# Patient Record
Sex: FEMALE | HISPANIC OR LATINO | Employment: PART TIME | URBAN - METROPOLITAN AREA
[De-identification: names, ages, dates, MRNs, and addresses within clinical notes are randomized per-mention and may not be internally consistent; named-entity substitution may affect disease eponyms.]

---

## 2018-04-20 ENCOUNTER — APPOINTMENT (OUTPATIENT)
Dept: RADIOLOGY | Facility: MEDICAL CENTER | Age: 58
DRG: 065 | End: 2018-04-20
Attending: EMERGENCY MEDICINE
Payer: MEDICAID

## 2018-04-20 ENCOUNTER — HOSPITAL ENCOUNTER (INPATIENT)
Facility: MEDICAL CENTER | Age: 58
LOS: 5 days | DRG: 065 | End: 2018-04-25
Attending: EMERGENCY MEDICINE | Admitting: HOSPITALIST
Payer: MEDICAID

## 2018-04-20 ENCOUNTER — RESOLUTE PROFESSIONAL BILLING HOSPITAL PROF FEE (OUTPATIENT)
Dept: HOSPITALIST | Facility: MEDICAL CENTER | Age: 58
End: 2018-04-20
Payer: MEDICAID

## 2018-04-20 DIAGNOSIS — I15.9 SECONDARY HYPERTENSION: ICD-10-CM

## 2018-04-20 DIAGNOSIS — I63.9 CEREBROVASCULAR ACCIDENT (CVA), UNSPECIFIED MECHANISM (HCC): ICD-10-CM

## 2018-04-20 DIAGNOSIS — I63.89 CEREBROVASCULAR ACCIDENT (CVA) DUE TO OTHER MECHANISM (HCC): ICD-10-CM

## 2018-04-20 PROBLEM — R79.89 ELEVATED TROPONIN: Status: ACTIVE | Noted: 2018-04-20

## 2018-04-20 PROBLEM — N39.0 UTI (URINARY TRACT INFECTION): Status: ACTIVE | Noted: 2018-04-20

## 2018-04-20 PROBLEM — F15.20 METHAMPHETAMINE DEPENDENCE (HCC): Status: ACTIVE | Noted: 2018-04-20

## 2018-04-20 PROBLEM — E87.6 HYPOKALEMIA: Status: ACTIVE | Noted: 2018-04-20

## 2018-04-20 PROBLEM — I16.0 HYPERTENSIVE URGENCY: Status: ACTIVE | Noted: 2018-04-20

## 2018-04-20 LAB
ALBUMIN SERPL BCP-MCNC: 4.1 G/DL (ref 3.2–4.9)
ALBUMIN/GLOB SERPL: 1.3 G/DL
ALP SERPL-CCNC: 134 U/L (ref 30–99)
ALT SERPL-CCNC: 33 U/L (ref 2–50)
AMPHET UR QL SCN: POSITIVE
ANION GAP SERPL CALC-SCNC: 9 MMOL/L (ref 0–11.9)
APPEARANCE UR: ABNORMAL
APTT PPP: 30.5 SEC (ref 24.7–36)
AST SERPL-CCNC: 33 U/L (ref 12–45)
BACTERIA #/AREA URNS HPF: ABNORMAL /HPF
BARBITURATES UR QL SCN: NEGATIVE
BASOPHILS # BLD AUTO: 0.5 % (ref 0–1.8)
BASOPHILS # BLD: 0.04 K/UL (ref 0–0.12)
BENZODIAZ UR QL SCN: NEGATIVE
BILIRUB SERPL-MCNC: 0.5 MG/DL (ref 0.1–1.5)
BILIRUB UR QL STRIP.AUTO: NEGATIVE
BUN SERPL-MCNC: 18 MG/DL (ref 8–22)
BZE UR QL SCN: NEGATIVE
CALCIUM SERPL-MCNC: 9.7 MG/DL (ref 8.5–10.5)
CANNABINOIDS UR QL SCN: NEGATIVE
CHLORIDE SERPL-SCNC: 107 MMOL/L (ref 96–112)
CO2 SERPL-SCNC: 27 MMOL/L (ref 20–33)
COLOR UR: YELLOW
CREAT SERPL-MCNC: 0.83 MG/DL (ref 0.5–1.4)
EOSINOPHIL # BLD AUTO: 0.21 K/UL (ref 0–0.51)
EOSINOPHIL NFR BLD: 2.6 % (ref 0–6.9)
EPI CELLS #/AREA URNS HPF: ABNORMAL /HPF
ERYTHROCYTE [DISTWIDTH] IN BLOOD BY AUTOMATED COUNT: 49.7 FL (ref 35.9–50)
GLOBULIN SER CALC-MCNC: 3.1 G/DL (ref 1.9–3.5)
GLUCOSE SERPL-MCNC: 96 MG/DL (ref 65–99)
GLUCOSE UR STRIP.AUTO-MCNC: NEGATIVE MG/DL
HCT VFR BLD AUTO: 51.2 % (ref 37–47)
HGB BLD-MCNC: 16.6 G/DL (ref 12–16)
HYALINE CASTS #/AREA URNS LPF: ABNORMAL /LPF
IMM GRANULOCYTES # BLD AUTO: 0.02 K/UL (ref 0–0.11)
IMM GRANULOCYTES NFR BLD AUTO: 0.2 % (ref 0–0.9)
INR PPP: 1 (ref 0.87–1.13)
KETONES UR STRIP.AUTO-MCNC: NEGATIVE MG/DL
LEUKOCYTE ESTERASE UR QL STRIP.AUTO: ABNORMAL
LYMPHOCYTES # BLD AUTO: 1.35 K/UL (ref 1–4.8)
LYMPHOCYTES NFR BLD: 16.8 % (ref 22–41)
MCH RBC QN AUTO: 27.9 PG (ref 27–33)
MCHC RBC AUTO-ENTMCNC: 32.4 G/DL (ref 33.6–35)
MCV RBC AUTO: 86.1 FL (ref 81.4–97.8)
METHADONE UR QL SCN: NEGATIVE
MICRO URNS: ABNORMAL
MONOCYTES # BLD AUTO: 0.43 K/UL (ref 0–0.85)
MONOCYTES NFR BLD AUTO: 5.3 % (ref 0–13.4)
NEUTROPHILS # BLD AUTO: 5.99 K/UL (ref 2–7.15)
NEUTROPHILS NFR BLD: 74.6 % (ref 44–72)
NITRITE UR QL STRIP.AUTO: POSITIVE
NRBC # BLD AUTO: 0 K/UL
NRBC BLD-RTO: 0 /100 WBC
OPIATES UR QL SCN: NEGATIVE
OXYCODONE UR QL SCN: NEGATIVE
PCP UR QL SCN: NEGATIVE
PH UR STRIP.AUTO: 8 [PH]
PLATELET # BLD AUTO: 240 K/UL (ref 164–446)
PMV BLD AUTO: 11.2 FL (ref 9–12.9)
POTASSIUM SERPL-SCNC: 3.4 MMOL/L (ref 3.6–5.5)
PROPOXYPH UR QL SCN: NEGATIVE
PROT SERPL-MCNC: 7.2 G/DL (ref 6–8.2)
PROT UR QL STRIP: NEGATIVE MG/DL
PROTHROMBIN TIME: 12.9 SEC (ref 12–14.6)
RBC # BLD AUTO: 5.95 M/UL (ref 4.2–5.4)
RBC # URNS HPF: ABNORMAL /HPF
RBC UR QL AUTO: NEGATIVE
SODIUM SERPL-SCNC: 143 MMOL/L (ref 135–145)
SP GR UR STRIP.AUTO: 1.02
TROPONIN I SERPL-MCNC: 0.21 NG/ML (ref 0–0.04)
TROPONIN I SERPL-MCNC: 0.23 NG/ML (ref 0–0.04)
TROPONIN I SERPL-MCNC: 0.24 NG/ML (ref 0–0.04)
UROBILINOGEN UR STRIP.AUTO-MCNC: 1 MG/DL
WBC # BLD AUTO: 8 K/UL (ref 4.8–10.8)
WBC #/AREA URNS HPF: ABNORMAL /HPF

## 2018-04-20 PROCEDURE — 84484 ASSAY OF TROPONIN QUANT: CPT

## 2018-04-20 PROCEDURE — 71045 X-RAY EXAM CHEST 1 VIEW: CPT

## 2018-04-20 PROCEDURE — 85610 PROTHROMBIN TIME: CPT

## 2018-04-20 PROCEDURE — 81001 URINALYSIS AUTO W/SCOPE: CPT

## 2018-04-20 PROCEDURE — 700105 HCHG RX REV CODE 258: Performed by: HOSPITALIST

## 2018-04-20 PROCEDURE — 94760 N-INVAS EAR/PLS OXIMETRY 1: CPT

## 2018-04-20 PROCEDURE — 770020 HCHG ROOM/CARE - TELE (206)

## 2018-04-20 PROCEDURE — 99285 EMERGENCY DEPT VISIT HI MDM: CPT

## 2018-04-20 PROCEDURE — 700102 HCHG RX REV CODE 250 W/ 637 OVERRIDE(OP): Performed by: HOSPITALIST

## 2018-04-20 PROCEDURE — 87086 URINE CULTURE/COLONY COUNT: CPT

## 2018-04-20 PROCEDURE — 83036 HEMOGLOBIN GLYCOSYLATED A1C: CPT

## 2018-04-20 PROCEDURE — 85730 THROMBOPLASTIN TIME PARTIAL: CPT

## 2018-04-20 PROCEDURE — 70498 CT ANGIOGRAPHY NECK: CPT

## 2018-04-20 PROCEDURE — 36415 COLL VENOUS BLD VENIPUNCTURE: CPT

## 2018-04-20 PROCEDURE — 87186 SC STD MICRODIL/AGAR DIL: CPT

## 2018-04-20 PROCEDURE — 99223 1ST HOSP IP/OBS HIGH 75: CPT | Performed by: HOSPITALIST

## 2018-04-20 PROCEDURE — 93005 ELECTROCARDIOGRAM TRACING: CPT | Performed by: EMERGENCY MEDICINE

## 2018-04-20 PROCEDURE — 87077 CULTURE AEROBIC IDENTIFY: CPT

## 2018-04-20 PROCEDURE — 80307 DRUG TEST PRSMV CHEM ANLYZR: CPT

## 2018-04-20 PROCEDURE — 700117 HCHG RX CONTRAST REV CODE 255: Performed by: EMERGENCY MEDICINE

## 2018-04-20 PROCEDURE — A9270 NON-COVERED ITEM OR SERVICE: HCPCS | Performed by: HOSPITALIST

## 2018-04-20 PROCEDURE — 80053 COMPREHEN METABOLIC PANEL: CPT

## 2018-04-20 PROCEDURE — 700111 HCHG RX REV CODE 636 W/ 250 OVERRIDE (IP): Performed by: HOSPITALIST

## 2018-04-20 PROCEDURE — 70496 CT ANGIOGRAPHY HEAD: CPT

## 2018-04-20 PROCEDURE — 70450 CT HEAD/BRAIN W/O DYE: CPT

## 2018-04-20 PROCEDURE — 85025 COMPLETE CBC W/AUTO DIFF WBC: CPT

## 2018-04-20 RX ORDER — SODIUM CHLORIDE 9 MG/ML
INJECTION, SOLUTION INTRAVENOUS CONTINUOUS
Status: DISCONTINUED | OUTPATIENT
Start: 2018-04-20 | End: 2018-04-24

## 2018-04-20 RX ORDER — HYDRALAZINE HYDROCHLORIDE 20 MG/ML
10 INJECTION INTRAMUSCULAR; INTRAVENOUS
Status: DISCONTINUED | OUTPATIENT
Start: 2018-04-20 | End: 2018-04-20

## 2018-04-20 RX ORDER — ASPIRIN 81 MG/1
324 TABLET, CHEWABLE ORAL DAILY
Status: DISCONTINUED | OUTPATIENT
Start: 2018-04-20 | End: 2018-04-25 | Stop reason: HOSPADM

## 2018-04-20 RX ORDER — POLYETHYLENE GLYCOL 3350 17 G/17G
1 POWDER, FOR SOLUTION ORAL
Status: DISCONTINUED | OUTPATIENT
Start: 2018-04-20 | End: 2018-04-25 | Stop reason: HOSPADM

## 2018-04-20 RX ORDER — LABETALOL HYDROCHLORIDE 5 MG/ML
10-20 INJECTION, SOLUTION INTRAVENOUS EVERY 4 HOURS PRN
Status: DISCONTINUED | OUTPATIENT
Start: 2018-04-20 | End: 2018-04-25 | Stop reason: HOSPADM

## 2018-04-20 RX ORDER — AMOXICILLIN 250 MG
2 CAPSULE ORAL 2 TIMES DAILY
Status: DISCONTINUED | OUTPATIENT
Start: 2018-04-20 | End: 2018-04-25 | Stop reason: HOSPADM

## 2018-04-20 RX ORDER — ONDANSETRON 4 MG/1
4 TABLET, ORALLY DISINTEGRATING ORAL EVERY 4 HOURS PRN
Status: DISCONTINUED | OUTPATIENT
Start: 2018-04-20 | End: 2018-04-25 | Stop reason: HOSPADM

## 2018-04-20 RX ORDER — PROMETHAZINE HYDROCHLORIDE 25 MG/1
12.5-25 TABLET ORAL EVERY 4 HOURS PRN
Status: DISCONTINUED | OUTPATIENT
Start: 2018-04-20 | End: 2018-04-25 | Stop reason: HOSPADM

## 2018-04-20 RX ORDER — AMLODIPINE BESYLATE 5 MG/1
5 TABLET ORAL DAILY
Status: ON HOLD | COMMUNITY
End: 2018-04-24

## 2018-04-20 RX ORDER — ASPIRIN 325 MG
325 TABLET ORAL DAILY
Status: DISCONTINUED | OUTPATIENT
Start: 2018-04-20 | End: 2018-04-25 | Stop reason: HOSPADM

## 2018-04-20 RX ORDER — POTASSIUM CHLORIDE 20 MEQ/1
20 TABLET, EXTENDED RELEASE ORAL ONCE
Status: COMPLETED | OUTPATIENT
Start: 2018-04-20 | End: 2018-04-20

## 2018-04-20 RX ORDER — LABETALOL HYDROCHLORIDE 5 MG/ML
10 INJECTION, SOLUTION INTRAVENOUS EVERY 4 HOURS PRN
Status: DISCONTINUED | OUTPATIENT
Start: 2018-04-20 | End: 2018-04-20

## 2018-04-20 RX ORDER — ONDANSETRON 2 MG/ML
4 INJECTION INTRAMUSCULAR; INTRAVENOUS EVERY 4 HOURS PRN
Status: DISCONTINUED | OUTPATIENT
Start: 2018-04-20 | End: 2018-04-25 | Stop reason: HOSPADM

## 2018-04-20 RX ORDER — AMLODIPINE BESYLATE 5 MG/1
5 TABLET ORAL DAILY
Status: DISCONTINUED | OUTPATIENT
Start: 2018-04-20 | End: 2018-04-25

## 2018-04-20 RX ORDER — ACETAMINOPHEN 325 MG/1
650 TABLET ORAL EVERY 6 HOURS PRN
Status: DISCONTINUED | OUTPATIENT
Start: 2018-04-20 | End: 2018-04-25 | Stop reason: HOSPADM

## 2018-04-20 RX ORDER — BISACODYL 10 MG
10 SUPPOSITORY, RECTAL RECTAL
Status: DISCONTINUED | OUTPATIENT
Start: 2018-04-20 | End: 2018-04-25 | Stop reason: HOSPADM

## 2018-04-20 RX ORDER — PROMETHAZINE HYDROCHLORIDE 25 MG/1
12.5-25 SUPPOSITORY RECTAL EVERY 4 HOURS PRN
Status: DISCONTINUED | OUTPATIENT
Start: 2018-04-20 | End: 2018-04-25 | Stop reason: HOSPADM

## 2018-04-20 RX ORDER — ATORVASTATIN CALCIUM 80 MG/1
80 TABLET, FILM COATED ORAL EVERY EVENING
Status: DISCONTINUED | OUTPATIENT
Start: 2018-04-20 | End: 2018-04-24

## 2018-04-20 RX ORDER — ASPIRIN 300 MG/1
300 SUPPOSITORY RECTAL DAILY
Status: DISCONTINUED | OUTPATIENT
Start: 2018-04-20 | End: 2018-04-25 | Stop reason: HOSPADM

## 2018-04-20 RX ADMIN — IOHEXOL 80 ML: 350 INJECTION, SOLUTION INTRAVENOUS at 14:45

## 2018-04-20 RX ADMIN — CEFTRIAXONE 2 G: 2 INJECTION, POWDER, FOR SOLUTION INTRAMUSCULAR; INTRAVENOUS at 21:48

## 2018-04-20 RX ADMIN — ATORVASTATIN CALCIUM 80 MG: 80 TABLET, FILM COATED ORAL at 18:02

## 2018-04-20 RX ADMIN — POTASSIUM CHLORIDE 20 MEQ: 1500 TABLET, EXTENDED RELEASE ORAL at 16:29

## 2018-04-20 RX ADMIN — AMLODIPINE BESYLATE 5 MG: 5 TABLET ORAL at 16:27

## 2018-04-20 RX ADMIN — ASPIRIN 325 MG: 325 TABLET ORAL at 16:27

## 2018-04-20 RX ADMIN — STANDARDIZED SENNA CONCENTRATE AND DOCUSATE SODIUM 2 TABLET: 8.6; 5 TABLET, FILM COATED ORAL at 18:02

## 2018-04-20 RX ADMIN — SODIUM CHLORIDE: 9 INJECTION, SOLUTION INTRAVENOUS at 16:23

## 2018-04-20 ASSESSMENT — LIFESTYLE VARIABLES
DO YOU DRINK ALCOHOL: NO
EVER_SMOKED: YES
EVER_SMOKED: YES
DO YOU DRINK ALCOHOL: NO
ALCOHOL_USE: NO

## 2018-04-20 ASSESSMENT — COPD QUESTIONNAIRES
HAVE YOU SMOKED AT LEAST 100 CIGARETTES IN YOUR ENTIRE LIFE: YES
DO YOU EVER COUGH UP ANY MUCUS OR PHLEGM?: NO/ONLY WITH OCCASIONAL COLDS OR INFECTIONS
COPD SCREENING SCORE: 3
DURING THE PAST 4 WEEKS HOW MUCH DID YOU FEEL SHORT OF BREATH: NONE/LITTLE OF THE TIME

## 2018-04-20 ASSESSMENT — PAIN SCALES - GENERAL
PAINLEVEL_OUTOF10: 0
PAINLEVEL_OUTOF10: 0

## 2018-04-20 NOTE — ED NOTES
Medication Reconciliation has been completed by interviewing patient with consent to do so with family/visitors in the room.    Allergies were reviewed    Antibiotics in the past 30 days: NO    Pharmacy:  [facility]

## 2018-04-20 NOTE — ED PROVIDER NOTES
ED Provider Note    Scribed for Renetta Das M.D. by Karson Robles. 4/20/2018, 11:49 AM.    Primary care provider: FAUSTO Soria  Means of arrival: wheel chair  History obtained from: patient  History limited by: none    CHIEF COMPLAINT  Chief Complaint   Patient presents with   • Weakness     R side weakness since 4/19 0200   • Slurred Speech     since last night   • Facial Droop     R side since 4/19       HPI  Lindsay Polanco is a 57 y.o. female who presents to the Emergency Department for evaluation of right-sided weakness onset 34 hours ago. Per patient, she was rearranging her room 2:00 AM yesterday morning when she became tired. When she went to sit down to rest, she noticed her right leg was shaky. The patient then took a shower, and after her shower, she continued to feel increasingly fatigued. After taking a nap, patient woke up with right-sided weakness, prompting her to go to the ED in [place]. She states she had a head CT with some blood work and an EKG, and she was discharged. The patient states she had continued right-sided weakness after her discharge, prompting her to come to the [facility] ED. She endorses associated slurred speech and facial droop. She confirms a history of hypertension. Patient admits she is a current everyday smoker, and she has smoked for many years. She confirms a history of hypertension, but admits she is noncompliant with hypertension medications. The patient denies any neurological history. She denies any vision changes or headache.     REVIEW OF SYSTEMS  Pertinent positives include right-sided weakness, slurred speech, facial droop. Pertinent negatives include no vision changes, headache. All other systems reviewed and negative. C.     PAST MEDICAL HISTORY   has a past medical history of Arthritis; Hypertension; and Stroke (CMS-HCC).    SURGICAL HISTORY  patient denies any surgical history    SOCIAL HISTORY  Social History   Substance Use Topics   • Smoking  "status: Current Every Day Smoker     Packs/day: 1.00     Years: 35.00     Types: Cigarettes   • Smokeless tobacco: Never Used   • Alcohol use No      History   Drug Use No       FAMILY HISTORY  Family History   Problem Relation Age of Onset   • Arthritis Mother    • Diabetes Mother        CURRENT MEDICATIONS  No current facility-administered medications on file prior to encounter.      Current Outpatient Prescriptions on File Prior to Encounter   Medication Sig Dispense Refill   • simvastatin (ZOCOR) 20 MG TABS Take 20 mg by mouth every evening.     • Metoprolol Tartrate (LOPRESSOR PO) Take 37.5 mg by mouth 2 Times a Day.     • hydrALAZINE (APRESOLINE) 25 MG TABS Take 1 Tab by mouth every 8 hours as needed (SBP > 160/100). 90 Tab 0   • amlodipine (NORVASC) 10 MG TABS Take 1 Tab by mouth every day. 30 Tab 2      ALLERGIES  No Known Allergies    PHYSICAL EXAM  VITAL SIGNS: /124 Comment: verified second pressure 204 99  Pulse 91   Temp 36.3 °C (97.3 °F) (Temporal)   Resp 14   Ht 1.549 m (5' 1\")   Wt 77.1 kg (170 lb)   SpO2 99%   BMI 32.12 kg/m²     Constitutional:  Middle-aged female, laying comfortably in the gurney , able to answer questions  HENT: Nose is normal in appearance without rhinorrhea, external ears are normal,  moist mucous membranes  Eyes: Anicteric, pupils are equal round and reactive, there is no conjunctival drainage or pallor. EOMI, no gaze disturbances noted.   Neck: The trachea is midline, there is no obvious mass or meningeal signs. No bruits appreciated.   Cardiovascular: Equal radial pulsation, regular rate and rhythm without murmurs gallops or rubs  Thorax & Lungs: Respiratory rate and effort are normal. There is normal chest excursion with respiration. No wheezes rhonchi or rales noted.  Abdomen: Abdomen is normal in appearance, normal bowel sounds, no pain with cough, nonpulsatile  :  No CVA tenderness to palpation  Musculoskeletal: No deformities noted in all 4 extremities. " Actively moves all 4 extremities  Skin: Visualized skin is warm, no erythema, no rash.  Neurologic:  Right facial weakness, partial paralysis noted. Right pronator drift. NIH score: 3.   Psychiatric: Normal mood and mentation    DIAGNOSTIC STUDIES / PROCEDURES    LABS  Results for orders placed or performed during the hospital encounter of 04/20/18   CBC WITH DIFFERENTIAL   Result Value Ref Range    WBC 8.0 4.8 - 10.8 K/uL    RBC 5.95 (H) 4.20 - 5.40 M/uL    Hemoglobin 16.6 (H) 12.0 - 16.0 g/dL    Hematocrit 51.2 (H) 37.0 - 47.0 %    MCV 86.1 81.4 - 97.8 fL    MCH 27.9 27.0 - 33.0 pg    MCHC 32.4 (L) 33.6 - 35.0 g/dL    RDW 49.7 35.9 - 50.0 fL    Platelet Count 240 164 - 446 K/uL    MPV 11.2 9.0 - 12.9 fL    Neutrophils-Polys 74.60 (H) 44.00 - 72.00 %    Lymphocytes 16.80 (L) 22.00 - 41.00 %    Monocytes 5.30 0.00 - 13.40 %    Eosinophils 2.60 0.00 - 6.90 %    Basophils 0.50 0.00 - 1.80 %    Immature Granulocytes 0.20 0.00 - 0.90 %    Nucleated RBC 0.00 /100 WBC    Neutrophils (Absolute) 5.99 2.00 - 7.15 K/uL    Lymphs (Absolute) 1.35 1.00 - 4.80 K/uL    Monos (Absolute) 0.43 0.00 - 0.85 K/uL    Eos (Absolute) 0.21 0.00 - 0.51 K/uL    Baso (Absolute) 0.04 0.00 - 0.12 K/uL    Immature Granulocytes (abs) 0.02 0.00 - 0.11 K/uL    NRBC (Absolute) 0.00 K/uL   COMP METABOLIC PANEL   Result Value Ref Range    Sodium 143 135 - 145 mmol/L    Potassium 3.4 (L) 3.6 - 5.5 mmol/L    Chloride 107 96 - 112 mmol/L    Co2 27 20 - 33 mmol/L    Anion Gap 9.0 0.0 - 11.9    Glucose 96 65 - 99 mg/dL    Bun 18 8 - 22 mg/dL    Creatinine 0.83 0.50 - 1.40 mg/dL    Calcium 9.7 8.5 - 10.5 mg/dL    AST(SGOT) 33 12 - 45 U/L    ALT(SGPT) 33 2 - 50 U/L    Alkaline Phosphatase 134 (H) 30 - 99 U/L    Total Bilirubin 0.5 0.1 - 1.5 mg/dL    Albumin 4.1 3.2 - 4.9 g/dL    Total Protein 7.2 6.0 - 8.2 g/dL    Globulin 3.1 1.9 - 3.5 g/dL    A-G Ratio 1.3 g/dL   PROTHROMBIN TIME   Result Value Ref Range    PT 12.9 12.0 - 14.6 sec    INR 1.00 0.87 - 1.13    APTT   Result Value Ref Range    APTT 30.5 24.7 - 36.0 sec   TROPONIN   Result Value Ref Range    Troponin I 0.24 (H) 0.00 - 0.04 ng/mL   ESTIMATED GFR   Result Value Ref Range    GFR If African American >60 >60 mL/min/1.73 m 2    GFR If Non African American >60 >60 mL/min/1.73 m 2   EKG (NOW)   Result Value Ref Range    Report       [facility] Emergency Dept.    Test Date:  2018  Pt Name:    LIU THEODORE              Department: ER  MRN:        [id number]                      Room:        09  Gender:     Female                       Technician: [id number]  :        [id number]                   Requested By:EZIO PETERS  Order #:    [id number]                    Reading MD:    Measurements  Intervals                                Axis  Rate:       79                           P:          52  NE:         156                          QRS:        101  QRSD:       100                          T:          100  QT:         432  QTc:        496    Interpretive Statements  SINUS RHYTHM  RIGHT AXIS DEVIATION  PROBABLE INFERIOR INFARCT, OLD  LATERAL LEADS ARE ALSO INVOLVED  BORDERLINE PROLONGED QT INTERVAL  Compared to ECG 10/22/2013 01:22:15  Right-axis deviation now present  Myocardial infarct finding still present        All labs reviewed by me.    EKG  I interpreted this EKG myself.  This is a 12-lead study.  The rhythm is sinus with a rate of 79. There is ST depression with T wave inversion in I and aVL, which is unchanged from prior EKG done in [place].  Interpretation: Probable LVH.     RADIOLOGY  CT-CTA NECK WITH & W/O-POST PROCESSING   Final Result      No focal stenosis, dissection or occlusion of the cervical carotid or vertebral arteries.      CT-CTA HEAD WITH & W/O-POST PROCESS   Final Result      No intracranial aneurysm, focal stenosis or abrupt large vessel cut off.         CT-HEAD W/O   Final Result      1.  Chronic bilateral basal ganglia lacunar infarcts, with mild  compensatory enlargement of frontal horn LEFT lateral ventricle.   2.  No acute intracranial hemorrhage or territorial infarct.   3.  Chronic maxillary sinus disease, worse on the LEFT.      DX-CHEST-PORTABLE (1 VIEW)   Final Result      Cardiomegaly           The radiologist's interpretation of all radiological studies have been reviewed by me.    COURSE & MEDICAL DECISION MAKING  Nursing notes and vital signs were reviewed. (See chart for details)  The patient's  Records from [facility] were reviewed which show CT-head with prior small lacunar infarcts without significant changes, Troponin was slightly elevated at 2.5. Documented normal physical exam with a systolic blood pressure of 221. History was obtained from the patient;     The patient presents with right-sided weakness, and the differential diagnosis includes but is not limited to: hemorrhagic stroke, brain mass. The patient has a history of ischemic stroke. Will rule out hemorrhagic stroke secondary to hypertension.       11:49 AM Initial orders in the Emergency Department included chest x-ray, CBC with differential, CMP, PT, APTT, Troponin, UA, EKG.    11:58 AM - Paged Neurology    12:26 PM - I discussed the patient's case and the above findings with Dr. Sarabia (Neurology) who will consult the patient. She recommends a CT-head is ordered.     The patient is not a candidate for IV alteplase because the onset of her symptoms are outside the window of treatment.     1:53 PM - Paged Hospitalist    1:54 PM - I discussed the patient's case and the above findings with Dr. Shae Swift (Hositalist) who agrees to admit the patient.      ED testing reveals continued mild elevation of troponin, maybe secondary to persistent hypertension. She was seen by Neurology. MRI in 2013 revealed a 1 cm sized ring enhancing lesion. She will be admitted for inpatient work-up and continued imaging.      DISPOSITION:  Patient will be admitted to Dr. Shae Swift in  guarded condition.      FINAL IMPRESSION  1. Cerebrovascular accident (CVA), unspecified mechanism (CMS-HCC)    2. Secondary hypertension          I, Karson Robles (Scribe), am scribing for, and in the presence of, Renetta Das M.D..    Electronically signed by: Karson Robles (Scribe), 4/20/2018    I, Renetta Das M.D. personally performed the services described in this documentation, as scribed by Karson Robles in my presence, and it is both accurate and complete.    The note accurately reflects work and decisions made by me.  Renetta Das  4/21/2018  3:24 PM

## 2018-04-20 NOTE — PROGRESS NOTES
Pt arrived to the unit. VSS, pt is in SR. Assessment complete. A&O x 4. No signs of distress noted at this time. Tele monitor in place, monitor room notified. Pt denies pain. Pt is oriented to the unit, call light, phone system. Fall precaution in place and appropriate signs in place. Call light within reach. Bed is locked and in the lowest position. Pt is educated regarding fall precautions and importance of calling for assistance. Pt denies any additional needs at this time. Will continue to monitor.

## 2018-04-20 NOTE — ED NOTES
"Pt arrives to triage via wheelchair with c/c right side weakness, slurred speech & right facial droop x33 hours.  Pt seen & discharged from a hospital in [place] yesterday- \"I don't know what they said was wrong with me.\"  Daughter brought pt to [facility] for a 2nd opinion.  A&ox4.  Positive facial droop, positive right arm drift, weak push/pulls with R leg, unable to get self out of wheelchair.  Charge RN aware of pt.   "

## 2018-04-20 NOTE — CONSULTS
CC: Right arm and leg weakness    Date of Admission: 4/20/2018    Today's Date: 04/20/18    Consulting Physician: Renetta Das M.D.      HPI:    Lindsay Polanco is a 57 y.o. female with a history of methamphetamine abuse, htn, hld, renal failure, and possible stroke vs. Demyelinating lesion with left side weakness, who developed right arm and leg weakness yesterday at 2am. The patient herself is a poor historian. Her daughter recalls that she had similar symptoms a few years ago. In the EMR, her discharge summary from September 2013 stated that she had a ring enhancing lesion on her brain MRI which was possibly a demyelinating lesion versus stroke or tumor. She had a lumbar puncture that showed zero oligoclonal bands. Cytology report said they could not rule out a lymphoproliferative disorder at that time and suggested sending higher volume tap, but that does not appear to have been done. She was thought to possibly have had a stroke and was started on antihypertensive medications and a statin, but she did not continue taking these medications. During that hospitalization, she also had acute renal failure. She has not followed up with any neurologists since that time.     Pmhx: Prior episode of left side weakness, htn, hld    Social: Current smoker. Former meth abuse. Denies currently.     Fam: Mom with diabetes      ROS:   Constitutional: No fevers or chills.  Eyes: No blurry vision or eye pain.  ENT: No dysphagia or hearing loss.  Respiratory: No cough or shortness of breath.  Cardiovascular: No chest pain or palpitations.  GI: No nausea, vomiting, or diarrhea.  : No urinary incontinence or dysuria.  Musculoskeletal: No joint swelling or arthralgias.  Skin: No skin rashes.  Neuro: No headaches, dizziness, or tremors.  Endocrine: No heat or cold intolerance. No polydipsia or polyuria.  Psych: No depression or anxiety.  Heme/Lymph: No easy bruising or swollen lymph nodes.  All other systems were reviewed  and were negative.     Past Medical History:   Past Medical History:   Diagnosis Date   • Arthritis    • Hypertension    • Stroke (CMS-HCC)        Past Surgical History: , tonsillectomy.     Social History:   Social History     Social History   • Marital status:      Spouse name: N/A   • Number of children: N/A   • Years of education: N/A     Occupational History   • Not on file.     Social History Main Topics   • Smoking status: Current Every Day Smoker     Packs/day: 1.00     Years: 35.00     Types: Cigarettes   • Smokeless tobacco: Never Used   • Alcohol use No   • Drug use: No   • Sexual activity: Not on file     Other Topics Concern   • Not on file     Social History Narrative   • No narrative on file       Allergies: No Known Allergies    No current facility-administered medications for this encounter.     Current Outpatient Prescriptions:   •  amLODIPine (NORVASC) 5 MG Tab, Take 5 mg by mouth every day., Disp: , Rfl:       Physical Exam:   Constitutional: Well-developed, well-nourished,  female, daughter at bedside. Appears stated age.  Cardiovascular: RRR, with no murmurs, rubs or gallops. No carotid bruits. No peripheral edema, pedal pulses intact.   Respiratory: Lungs CTA B/L, no W/R/R. Respiratory effort appears normal.    Skin: Warm, dry, intact. No rashes observed. Multiple tattoos.  Eyes: Sclera anicteric. No eyelid ptosis.  Abdomen: Soft NTTP. No hepatosplenomegaly.   ENMT: Good dentition.  Oropharynx clear.   Extremities: No swelling or edema.  Neck: Supple, no thyromegaly.  Neurologic:   Mental Status: Awake, alert, oriented to person, place, and time.   Speech: Fluent. Able to name and repeat.   Memory: Able to recall recent and remote events accurately.    Concentration: Able to focus on history and follow multi-step commands.              Fund of Knowledge: Appropriate   Cranial Nerves:     CN II: PERRL. No afferent pupillary defect.    CN III, IV, VI: Good eye closure,  EOMI.     CN V: Facial sensation intact and symmetric.     CN VII: No facial asymmetry.     CN VIII: Hearing intact.     CN IX and X: Palate elevates symmetrically. Normal gag reflex.    CN XI: Symmetric shoulder shrug.     CN XII: Tongue midline.    Sensory: Intact light touch.   Coordination: Decreased coordination in right hand with finger tapping.       DTR's: 2+ throughout without clonus.    Babinski: Toes downgoing bilaterally.   Romberg: Negative.   Movements: No tremors observed.   Musculoskeletal:    Strength: Right upper extremity very mild drift, unable to lift right leg fully. 5/5 on the left.   Gait: Deferred   Tone: Normal bulk and tone.   Joints: No swelling in the joints of hands or knees.      1a. LOC: 0  1b. LOC Questions: 0  1c. LOC Commands: 0  2. Best Gaze: 1  3. Visual Fields: 0  4. Facial Paresis: 0  5a. Motor arm left: 0  5b. Motor arm right: 0  6a. Motor leg left: 1  6b. Motor leg right: 1  7. Sensory: 0  8. Best Language: 0  9. Limb Ataxia: 0  10. Dysarthria: 1  11. Extinction/Inattention: 0    Total NIH 4      Labs:  Recent Labs      04/20/18   1141   WBC  8.0   RBC  5.95*   HEMOGLOBIN  16.6*   HEMATOCRIT  51.2*   MCV  86.1   MCH  27.9   RDW  49.7   PLATELETCT  240   MPV  11.2   NEUTSPOLYS  74.60*   LYMPHOCYTES  16.80*   MONOCYTES  5.30   EOSINOPHILS  2.60   BASOPHILS  0.50       Recent Labs      04/20/18   1141   SODIUM  143   POTASSIUM  3.4*   CHLORIDE  107   CO2  27   GLUCOSE  96   BUN  18     Results for LIU THEODORE (MRN [id number]) as of 4/20/2018 14:07   Ref. Range 9/30/2013 09:34   Number Of Tubes Unknown 3   Volume Latest Units: mL 10.0   Color-Body Fluid Unknown Colorless   Character-Body Fluid Unknown Clear   Supernatant Appearance Unknown Colorless   Total WBC Count Latest Ref Range: 0 - 10 cells/uL 3   Total RBC Count Latest Units: cells/uL 11   Lymphs Latest Units: % 80   Mononuclear Cells - CSF Latest Units: % 20   CSF Tube Number Unknown 3   Glucose CSF Latest Ref Range:  40 - 80 mg/dL 51   Total Protein, CSF Latest Ref Range: 15 - 45 mg/dL 89 (H)   IgG CSF Latest Ref Range: 0.0 - 6.0 mg/dL 4.9       Imaging Reviewed:   MRI Brain w/wo contrast from 9/28/2013  1. There is an approximately 1 cm sized ring enhancing lesion in the left caudate nucleus.  Minimal amount of subacute hemorrhage is noted surrounding the lesion.  The differential diagnosis includes subacute hematoma, subacute infarct with hemorrhagic transformation, acute tumefactive demyelinating lesion and less likely neoplasm.  Pre-and postcontrast MR brain is recommended after 4 weeks.  2. Nonspecific T2 hyperintensities adjacent to the bilateral lateral ventricles.  The differencial diagnosis includes microvascular ischemic disease and demyelinating plaques.  3. Few punctate chronic microbleeds.    Assessment/Plan:  56 yo F with pmhx of htn, hld, methamphetamine abuse and prior episode of left sided weakness in 2013 who had a ring enhancing lesion thought to be attributable to an acute stroke, who now presents with right arm and leg weakness and dysarthria since yesterday. She is outside of the time window for alteplase. I would like her to undergo CTA of the Head and Neck to rule out any acute arterial occlusions and be admitted for a brain MRI w/wo contrast.     Recommendation:   MRI of the brain w/wo contrast  CTA Head and NEck  Echo  A1c, Lipid Profile, Sed Rate  PT/OT/Speech    Vanessa Sarabia D.O., M.P.H  MS specialist.   Board Certified Neurologist.  Neurology Clerkship Director, [facility].    Neurology,  [facility].   Tel: [phone]  Fax: [phone]

## 2018-04-21 ENCOUNTER — APPOINTMENT (OUTPATIENT)
Dept: RADIOLOGY | Facility: MEDICAL CENTER | Age: 58
DRG: 065 | End: 2018-04-21
Attending: PSYCHIATRY & NEUROLOGY
Payer: MEDICAID

## 2018-04-21 ENCOUNTER — APPOINTMENT (OUTPATIENT)
Dept: RADIOLOGY | Facility: MEDICAL CENTER | Age: 58
DRG: 065 | End: 2018-04-21
Attending: HOSPITALIST
Payer: MEDICAID

## 2018-04-21 LAB
ANION GAP SERPL CALC-SCNC: 8 MMOL/L (ref 0–11.9)
BASOPHILS # BLD AUTO: 0.4 % (ref 0–1.8)
BASOPHILS # BLD: 0.04 K/UL (ref 0–0.12)
BUN SERPL-MCNC: 16 MG/DL (ref 8–22)
CALCIUM SERPL-MCNC: 9.3 MG/DL (ref 8.5–10.5)
CHLORIDE SERPL-SCNC: 108 MMOL/L (ref 96–112)
CHOLEST SERPL-MCNC: 184 MG/DL (ref 100–199)
CO2 SERPL-SCNC: 23 MMOL/L (ref 20–33)
CREAT SERPL-MCNC: 0.77 MG/DL (ref 0.5–1.4)
EOSINOPHIL # BLD AUTO: 0.37 K/UL (ref 0–0.51)
EOSINOPHIL NFR BLD: 3.3 % (ref 0–6.9)
ERYTHROCYTE [DISTWIDTH] IN BLOOD BY AUTOMATED COUNT: 49.6 FL (ref 35.9–50)
EST. AVERAGE GLUCOSE BLD GHB EST-MCNC: 117 MG/DL
GLUCOSE SERPL-MCNC: 93 MG/DL (ref 65–99)
HBA1C MFR BLD: 5.7 % (ref 0–5.6)
HCT VFR BLD AUTO: 48.6 % (ref 37–47)
HDLC SERPL-MCNC: 47 MG/DL
HGB BLD-MCNC: 16 G/DL (ref 12–16)
IMM GRANULOCYTES # BLD AUTO: 0.05 K/UL (ref 0–0.11)
IMM GRANULOCYTES NFR BLD AUTO: 0.4 % (ref 0–0.9)
LDLC SERPL CALC-MCNC: 104 MG/DL
LV EJECT FRACT  99904: 55
LV EJECT FRACT MOD 2C 99903: 60.73
LV EJECT FRACT MOD 4C 99902: 47.66
LV EJECT FRACT MOD BP 99901: 55.73
LYMPHOCYTES # BLD AUTO: 1.64 K/UL (ref 1–4.8)
LYMPHOCYTES NFR BLD: 14.7 % (ref 22–41)
MCH RBC QN AUTO: 28.2 PG (ref 27–33)
MCHC RBC AUTO-ENTMCNC: 32.9 G/DL (ref 33.6–35)
MCV RBC AUTO: 85.7 FL (ref 81.4–97.8)
MONOCYTES # BLD AUTO: 0.53 K/UL (ref 0–0.85)
MONOCYTES NFR BLD AUTO: 4.7 % (ref 0–13.4)
NEUTROPHILS # BLD AUTO: 8.54 K/UL (ref 2–7.15)
NEUTROPHILS NFR BLD: 76.5 % (ref 44–72)
NRBC # BLD AUTO: 0 K/UL
NRBC BLD-RTO: 0 /100 WBC
PLATELET # BLD AUTO: 252 K/UL (ref 164–446)
PMV BLD AUTO: 11.3 FL (ref 9–12.9)
POTASSIUM SERPL-SCNC: 3.2 MMOL/L (ref 3.6–5.5)
RBC # BLD AUTO: 5.67 M/UL (ref 4.2–5.4)
SODIUM SERPL-SCNC: 139 MMOL/L (ref 135–145)
TRIGL SERPL-MCNC: 166 MG/DL (ref 0–149)
WBC # BLD AUTO: 11.2 K/UL (ref 4.8–10.8)

## 2018-04-21 PROCEDURE — 80061 LIPID PANEL: CPT

## 2018-04-21 PROCEDURE — 36415 COLL VENOUS BLD VENIPUNCTURE: CPT

## 2018-04-21 PROCEDURE — 99232 SBSQ HOSP IP/OBS MODERATE 35: CPT | Performed by: HOSPITALIST

## 2018-04-21 PROCEDURE — 700105 HCHG RX REV CODE 258: Performed by: HOSPITALIST

## 2018-04-21 PROCEDURE — 700111 HCHG RX REV CODE 636 W/ 250 OVERRIDE (IP): Performed by: HOSPITALIST

## 2018-04-21 PROCEDURE — A9270 NON-COVERED ITEM OR SERVICE: HCPCS | Performed by: HOSPITALIST

## 2018-04-21 PROCEDURE — 80048 BASIC METABOLIC PNL TOTAL CA: CPT

## 2018-04-21 PROCEDURE — 770020 HCHG ROOM/CARE - TELE (206)

## 2018-04-21 PROCEDURE — 93306 TTE W/DOPPLER COMPLETE: CPT | Mod: 26 | Performed by: INTERNAL MEDICINE

## 2018-04-21 PROCEDURE — 700101 HCHG RX REV CODE 250: Performed by: HOSPITALIST

## 2018-04-21 PROCEDURE — 93306 TTE W/DOPPLER COMPLETE: CPT

## 2018-04-21 PROCEDURE — 700102 HCHG RX REV CODE 250 W/ 637 OVERRIDE(OP): Performed by: HOSPITALIST

## 2018-04-21 PROCEDURE — 85025 COMPLETE CBC W/AUTO DIFF WBC: CPT

## 2018-04-21 RX ORDER — ENALAPRILAT 1.25 MG/ML
1.25 INJECTION INTRAVENOUS EVERY 6 HOURS PRN
Status: DISCONTINUED | OUTPATIENT
Start: 2018-04-21 | End: 2018-04-25 | Stop reason: HOSPADM

## 2018-04-21 RX ORDER — LORAZEPAM 2 MG/ML
1 INJECTION INTRAMUSCULAR ONCE
Status: COMPLETED | OUTPATIENT
Start: 2018-04-21 | End: 2018-04-22

## 2018-04-21 RX ORDER — HYDRALAZINE HYDROCHLORIDE 20 MG/ML
10 INJECTION INTRAMUSCULAR; INTRAVENOUS EVERY 6 HOURS PRN
Status: DISCONTINUED | OUTPATIENT
Start: 2018-04-21 | End: 2018-04-25 | Stop reason: HOSPADM

## 2018-04-21 RX ADMIN — STANDARDIZED SENNA CONCENTRATE AND DOCUSATE SODIUM 2 TABLET: 8.6; 5 TABLET, FILM COATED ORAL at 05:27

## 2018-04-21 RX ADMIN — ENOXAPARIN SODIUM 40 MG: 100 INJECTION SUBCUTANEOUS at 05:27

## 2018-04-21 RX ADMIN — CEFTRIAXONE 2 G: 2 INJECTION, POWDER, FOR SOLUTION INTRAMUSCULAR; INTRAVENOUS at 21:30

## 2018-04-21 RX ADMIN — ASPIRIN 325 MG: 325 TABLET ORAL at 05:26

## 2018-04-21 RX ADMIN — SODIUM CHLORIDE: 9 INJECTION, SOLUTION INTRAVENOUS at 21:33

## 2018-04-21 RX ADMIN — STANDARDIZED SENNA CONCENTRATE AND DOCUSATE SODIUM 2 TABLET: 8.6; 5 TABLET, FILM COATED ORAL at 18:05

## 2018-04-21 RX ADMIN — ATORVASTATIN CALCIUM 80 MG: 80 TABLET, FILM COATED ORAL at 18:05

## 2018-04-21 RX ADMIN — LABETALOL HYDROCHLORIDE 10 MG: 5 INJECTION, SOLUTION INTRAVENOUS at 21:03

## 2018-04-21 RX ADMIN — AMLODIPINE BESYLATE 5 MG: 5 TABLET ORAL at 05:26

## 2018-04-21 ASSESSMENT — ENCOUNTER SYMPTOMS
DOUBLE VISION: 0
SPEECH CHANGE: 0
PND: 0
HEMOPTYSIS: 0
HEADACHES: 0
NERVOUS/ANXIOUS: 1
CLAUDICATION: 0
SHORTNESS OF BREATH: 0
FEVER: 0
TREMORS: 0
PALPITATIONS: 0
DIZZINESS: 0
CHILLS: 0
HEARTBURN: 0
BLURRED VISION: 0
ORTHOPNEA: 0
SORE THROAT: 0
SPUTUM PRODUCTION: 0
FOCAL WEAKNESS: 1
STRIDOR: 0
COUGH: 0
VOMITING: 0
SENSORY CHANGE: 0
BACK PAIN: 0
CONSTIPATION: 0
PHOTOPHOBIA: 0
WEAKNESS: 1
NECK PAIN: 0
NAUSEA: 0
DEPRESSION: 0
MEMORY LOSS: 0
MYALGIAS: 0
EYE PAIN: 0
TINGLING: 0
BLOOD IN STOOL: 0

## 2018-04-21 ASSESSMENT — PAIN SCALES - GENERAL
PAINLEVEL_OUTOF10: 0

## 2018-04-21 ASSESSMENT — PATIENT HEALTH QUESTIONNAIRE - PHQ9
1. LITTLE INTEREST OR PLEASURE IN DOING THINGS: NOT AT ALL
SUM OF ALL RESPONSES TO PHQ9 QUESTIONS 1 AND 2: 0
2. FEELING DOWN, DEPRESSED, IRRITABLE, OR HOPELESS: NOT AT ALL

## 2018-04-21 ASSESSMENT — LIFESTYLE VARIABLES
SUBSTANCE_ABUSE: 1
DO YOU DRINK ALCOHOL: NO

## 2018-04-21 NOTE — PROGRESS NOTES
Assumed care of PT A&O x 4. Pt resting in bed with no signs of labored breathing. On RA. Tele monitor in place, cardiac rhythm being monitored. Call light within reach, bed in lowest position, upper bed rails up, bed alarm on. Pt was updated on plan of care for the night . Will continue to monitor. Daughter at bedside.

## 2018-04-21 NOTE — ASSESSMENT & PLAN NOTE
She states Last use apparently 1 month ago but UTOX + meth on this admission.  Cessation counseling provided.  She denies any IV use

## 2018-04-21 NOTE — ASSESSMENT & PLAN NOTE
Hx of MRI in 2013 which showed  ring-enhancing lesion never followed up.  She has no swallow dysfunction.  MR-Cervical/thoracic neg for acute processes  MRI brain shows Small sized acute left thalamic infarct and bilateral lacunar infarcts  Echo was normal.  Neurology signed off  Cont aspirin  Discontinue statin due to transaminitis, this was discussed with patient regarding risks outweighing benefits for secondary prevention of CVA  She was recommended skilled nursing however the patient does not want this option, will provide outpatient PTOT prescription to be done in [place]

## 2018-04-21 NOTE — CARE PLAN
Problem: Safety  Goal: Will remain free from falls  Outcome: PROGRESSING AS EXPECTED  Pt mobility assessed at beginning of shift. Pt is a 1-2 assist. Fall precautions in place. Non-slip socks on. Bed in lowest locked position. Bed alarm on. Call light within reach. Pt educated to call for assistance and verbalizes understanding.    Problem: Knowledge Deficit  Goal: Knowledge of disease process/condition, treatment plan, diagnostic tests, and medications will improve  Outcome: PROGRESSING AS EXPECTED  Pt educated about disease process. Reason why medications are taken. And informed about treatment plan.

## 2018-04-21 NOTE — H&P
CHIEF COMPLAINT:  Right upper extremity and right lower extremity weakness.    HISTORY OF PRESENT ILLNESS:  The patient is a 57-year-old female who developed   right upper and right lower extremity weakness yesterday morning.  She   presented to her local emergency room in [place].  She was noted to have   significantly elevated blood pressure and mildly elevated troponin.  She was   treated for hypertension.  They discuss her elevated troponin with Dr. Villegas   who did not feel that she needed inpatient workup as her repeat troponin   remained the same and stressed good blood pressure management and outpatient   followup with him.  The patient is an extremely poor historian.  I am not sure   if she had a focal deficit yesterday as the records from the outlying   emergency room referred that she has generalized weakness.  No family was   present at the bedside during my interview.  The patient is very emotionally   upset and crying during the whole interview.  She is not very cooperative with   history and review of systems.  She denies any chest pain.  She denies any   headache.  She initially denied any drug use; however, when I told her that   her records reported a history of methamphetamine use, she said that her last   use was about a month ago.  She had an MRI in our facility in September 2013   with a ring-enhancing lesion and followup MRI was recommended; however,   apparently the patient has not had any followup on this.  Her weakness is   about the same.  She denies any trauma.  She has not been compliant with her   blood pressure or statin medications.    REVIEW OF SYSTEMS:  As above, otherwise limited by the patient being a poor   historian.    PAST MEDICAL HISTORY:  Significant for:  1.  History of left-sided weakness, suspected to be a stroke with an abnormal   MRI as mentioned above.  2.  Hypertension.  3.  Dyslipidemia.  4.  Arthritis.    PAST SURGICAL HISTORY:  Negative.    SOCIAL HISTORY:  She  smokes 1 pack of cigarettes.  She denies alcohol use.    She is not very forthcoming about her drug use; however, she admits to using   methamphetamine about a month ago.    FAMILY HISTORY:  Positive for diabetes.    HOME MEDICATIONS:  She has not been taking any medications, although she was   prescribed amlodipine yesterday.    PHYSICAL EXAMINATION:  GENERAL:  She is alert and oriented x3.  She is emotionally upset.  VITAL SIGNS:  Temperature is 36.3, pulse is 91, respiratory rate is 14, blood   pressure is 220/110, and pulse oximetry is 99%.  HEAD AND NECK:  Pupils equal.  Supple neck.  No jugular venous distention.    Oropharynx is clear.  No cervical lymphadenopathy.  HEART:  Regular rate and rhythm, normal S1, S2.  No murmurs, rubs, or gallops.  LUNGS:  Clear with symmetric air entry bilaterally.  CHEST:  No chest wall tenderness.  ABDOMEN:  Soft, nontender.  Bowel sounds are positive.  No hepatosplenomegaly.  EXTREMITIES:  No edema, no clubbing, no cyanosis.  NEUROLOGIC:  She has right upper and right lower extremity weakness, 4/5.  No   other focal deficits noted.  SKIN:  No rash.    DIAGNOSTICS:  White blood cell count is 8.0, hemoglobin 16.6, hematocrit 51.2,   and platelet count 240.  Sodium is 143, potassium 3.4, chloride 107,   bicarbonate 27, glucose 96, BUN 18, creatinine 0.83, AST is 33, ALT is also   33, and alkaline phosphatase 134.  Urine drug screen is positive for   amphetamines.  Troponin I is 0.24.  Of note, her troponin at the outlying   facility was 0.25 and 0.31 yesterday at 8:30 a.m. and 12:54 p.m.  Urinalysis   reveals 20-50 white blood cells and positive nitrites.  Chest x-ray reveals   cardiomegaly.  CT of the head without contrast reveals chronic bilateral basal   ganglia lacunar infarcts with mild compensatory enlargement of frontal horn,   left lateral ventricle, no acute intracranial hemorrhage or territory infarct.    Chronic maxillary sinus disease, worse on the left.  CT  angiogram of the   neck reveals no focal stenosis, dissection, or occlusion of the cervical   carotid or vertebral arteries.  CT angiogram of the head revealed no   intracranial aneurysm, focal stenosis, or abrupt large-vessel cutoff.    ASSESSMENT:  1.  Hypertensive urgency.  2.  Elevated troponin, likely related to her hypertensive urgency and   methamphetamine abuse.  3.  Right hemiparesis, possible stroke.  4.  Noncompliance with medical therapy.  5.  Urinary tract infection.    PLAN:  The patient will be admitted and monitored on telemetry.  I will start   her on amlodipine and p.r.n. labetalol.  We will try to keep her systolic   blood pressure to 160 to 180 range given concern for possible stroke.    We will start her on aspirin.    We will check an MRI of the brain.    We will check an echocardiogram.    She has been evaluated by Dr. Sarabia from neurology who recommended MRI with   and without contrast.    We will trend her troponins.    Replete her potassium.    Patient was counseled on methamphetamine cessation and importance of   compliance with medical therapy.    She will be started on atorvastatin.    We will ask for PT and OT evaluations.    We will start her on Lovenox for DVT prophylaxis.    She will likely require more than 2 midnights stay for treatment of her   medical condition.       ____________________________________     MD GABE SEXTON / LI    DD:  04/20/2018 20:07:25  DT:  04/20/2018 20:50:52    D#:  [id number]  Job#:  [id number]

## 2018-04-21 NOTE — PROGRESS NOTES
[facility]    Date of Service: 2018    Chief Complaint  57 y.o. female admitted 2018 for evaluation of right upper and right lower extremity weakness that started one day prior to admission.    Interval Problem Update  Patient emotional and crying, poor historian. Says her left side if weak.  MRI brain pending.  Echocardiogram pending.  Neurology following, awaiting for further recommendations.    Consultants/Specialty  Neurology    Disposition  TBD        Review of Systems   Unable to perform ROS: Dementia   Constitutional: Positive for malaise/fatigue. Negative for chills and fever.   HENT: Negative for congestion, hearing loss, sore throat and tinnitus.    Eyes: Negative for blurred vision, double vision, photophobia and pain.   Respiratory: Negative for cough, hemoptysis, sputum production, shortness of breath and stridor.    Cardiovascular: Negative for chest pain, palpitations, orthopnea, claudication and PND.   Gastrointestinal: Negative for blood in stool, constipation, heartburn, melena, nausea and vomiting.   Genitourinary: Negative for dysuria, frequency and urgency.   Musculoskeletal: Negative for back pain, myalgias and neck pain.   Neurological: Positive for focal weakness and weakness. Negative for dizziness, tingling, tremors, sensory change, speech change and headaches.   Psychiatric/Behavioral: Positive for substance abuse. Negative for depression, memory loss and suicidal ideas. The patient is nervous/anxious.       Physical Exam  Laboratory/Imaging   Hemodynamics  Temp (24hrs), Av.4 °C (97.6 °F), Min:36.3 °C (97.3 °F), Max:36.7 °C (98 °F)   Temperature: 36.4 °C (97.5 °F)  Pulse  Av.9  Min: 72  Max: 91 Heart Rate (Monitored): 82  Blood Pressure: (!) 169/101, NIBP: 159/94      Respiratory      Respiration: 20, Pulse Oximetry: 94 %, O2 Daily Delivery Respiratory : Room Air with O2 Available     Work Of Breathing / Effort: Mild  RUL Breath Sounds: Clear, RML  Breath Sounds: Diminished, RLL Breath Sounds: Diminished, HOSSEIN Breath Sounds: Diminished, LLL Breath Sounds: Diminished    Fluids    Intake/Output Summary (Last 24 hours) at 04/21/18 0800  Last data filed at 04/21/18 0600   Gross per 24 hour   Intake             1540 ml   Output              800 ml   Net              740 ml       Nutrition  Orders Placed This Encounter   Procedures   • Diet Order     Standing Status:   Standing     Number of Occurrences:   1     Order Specific Question:   Diet:     Answer:   Cardiac [6]     Physical Exam   Constitutional: She is oriented to person, place, and time. She appears well-developed and well-nourished. No distress.   HENT:   Head: Normocephalic and atraumatic.   Mouth/Throat: No oropharyngeal exudate.   Eyes: Conjunctivae are normal. Pupils are equal, round, and reactive to light. Right eye exhibits no discharge. No scleral icterus.   Neck: Neck supple. No JVD present. No thyromegaly present.   Cardiovascular: Normal rate and intact distal pulses.    No murmur heard.  Pulmonary/Chest: Effort normal and breath sounds normal. No stridor. No respiratory distress. She has no wheezes. She has no rales.   Abdominal: Soft. Bowel sounds are normal. She exhibits no distension. There is no tenderness. There is no rebound.   Musculoskeletal: She exhibits no edema.   Neurological: She is alert and oriented to person, place, and time. No cranial nerve deficit. She exhibits normal muscle tone.   Strength 5/5 Left UE/LE  Right LE 1/5, Right UE 1/5.  Right UE mild drift,       Skin: Skin is warm. She is not diaphoretic. No erythema.   Psychiatric: Her behavior is normal. Thought content normal.       Recent Labs      04/20/18   1141  04/21/18   0246   WBC  8.0  11.2*   RBC  5.95*  5.67*   HEMOGLOBIN  16.6*  16.0   HEMATOCRIT  51.2*  48.6*   MCV  86.1  85.7   MCH  27.9  28.2   MCHC  32.4*  32.9*   RDW  49.7  49.6   PLATELETCT  240  252   MPV  11.2  11.3     Recent Labs      04/20/18   1141   04/21/18   0246   SODIUM  143  139   POTASSIUM  3.4*  3.2*   CHLORIDE  107  108   CO2  27  23   GLUCOSE  96  93   BUN  18  16   CREATININE  0.83  0.77   CALCIUM  9.7  9.3     Recent Labs      04/20/18   1141   APTT  30.5   INR  1.00         Recent Labs      04/21/18   0246   TRIGLYCERIDE  166*   HDL  47   LDL  104*          Assessment/Plan     * Stroke (cerebrum) (CMS-HCC)- (present on admission)   Assessment & Plan    Hx of MRI in 2013 which showed  ring-enhancing lesion never followed up  CTA Head/neck neg   MRI brain pending  Neurology following.  Cont aspirin, statin for neuroprotective measures.        Methamphetamine dependence (CMS-HCC)- (present on admission)   Assessment & Plan    Last use apparently 1 month ago but UTOX + meth on this admission.  Cessation counseling provided.        Hypokalemia- (present on admission)   Assessment & Plan    Replenish lytes,   CTM labs         UTI (urinary tract infection)- (present on admission)   Assessment & Plan    +UA 20-50 WBCs,   Cultures pending  IV Ceftriaxone continue        Elevated troponin- (present on admission)   Assessment & Plan    Most likely demand from hypertensive urgency  Cont to trend.        Hypertensive urgency- (present on admission)   Assessment & Plan    Will allow for permissive HTN, will use PRN labetalol if sbp >200 systolic.  Patient non compliant with medications, need re-inforcement.      Patient plan of care discussed at multidisplinary team rounds and with patient and R.N at beside.       Quality-Core Measures   Reviewed items::  Labs reviewed, Medications reviewed and Radiology images reviewed  Stallworth catheter::  No Stallworth  DVT prophylaxis pharmacological::  Enoxaparin (Lovenox)  Ulcer Prophylaxis::  Yes

## 2018-04-22 ENCOUNTER — APPOINTMENT (OUTPATIENT)
Dept: RADIOLOGY | Facility: MEDICAL CENTER | Age: 58
DRG: 065 | End: 2018-04-22
Attending: PSYCHIATRY & NEUROLOGY
Payer: MEDICAID

## 2018-04-22 ENCOUNTER — APPOINTMENT (OUTPATIENT)
Dept: RADIOLOGY | Facility: MEDICAL CENTER | Age: 58
DRG: 065 | End: 2018-04-22
Attending: HOSPITALIST
Payer: MEDICAID

## 2018-04-22 LAB
ALBUMIN SERPL BCP-MCNC: 3.6 G/DL (ref 3.2–4.9)
ALBUMIN/GLOB SERPL: 1.3 G/DL
ALP SERPL-CCNC: 133 U/L (ref 30–99)
ALT SERPL-CCNC: 77 U/L (ref 2–50)
ANION GAP SERPL CALC-SCNC: 10 MMOL/L (ref 0–11.9)
AST SERPL-CCNC: 89 U/L (ref 12–45)
BACTERIA UR CULT: ABNORMAL
BACTERIA UR CULT: ABNORMAL
BASOPHILS # BLD AUTO: 0.5 % (ref 0–1.8)
BASOPHILS # BLD: 0.05 K/UL (ref 0–0.12)
BILIRUB SERPL-MCNC: 0.4 MG/DL (ref 0.1–1.5)
BUN SERPL-MCNC: 20 MG/DL (ref 8–22)
CALCIUM SERPL-MCNC: 9.2 MG/DL (ref 8.5–10.5)
CHLORIDE SERPL-SCNC: 108 MMOL/L (ref 96–112)
CO2 SERPL-SCNC: 23 MMOL/L (ref 20–33)
CREAT SERPL-MCNC: 0.82 MG/DL (ref 0.5–1.4)
EOSINOPHIL # BLD AUTO: 0.24 K/UL (ref 0–0.51)
EOSINOPHIL NFR BLD: 2.6 % (ref 0–6.9)
ERYTHROCYTE [DISTWIDTH] IN BLOOD BY AUTOMATED COUNT: 51.1 FL (ref 35.9–50)
GLOBULIN SER CALC-MCNC: 2.7 G/DL (ref 1.9–3.5)
GLUCOSE SERPL-MCNC: 93 MG/DL (ref 65–99)
HCT VFR BLD AUTO: 49.2 % (ref 37–47)
HGB BLD-MCNC: 15.6 G/DL (ref 12–16)
IMM GRANULOCYTES # BLD AUTO: 0.04 K/UL (ref 0–0.11)
IMM GRANULOCYTES NFR BLD AUTO: 0.4 % (ref 0–0.9)
LYMPHOCYTES # BLD AUTO: 1.49 K/UL (ref 1–4.8)
LYMPHOCYTES NFR BLD: 16 % (ref 22–41)
MAGNESIUM SERPL-MCNC: 1.9 MG/DL (ref 1.5–2.5)
MCH RBC QN AUTO: 27.8 PG (ref 27–33)
MCHC RBC AUTO-ENTMCNC: 31.7 G/DL (ref 33.6–35)
MCV RBC AUTO: 87.7 FL (ref 81.4–97.8)
MONOCYTES # BLD AUTO: 0.5 K/UL (ref 0–0.85)
MONOCYTES NFR BLD AUTO: 5.4 % (ref 0–13.4)
NEUTROPHILS # BLD AUTO: 7 K/UL (ref 2–7.15)
NEUTROPHILS NFR BLD: 75.1 % (ref 44–72)
NRBC # BLD AUTO: 0 K/UL
NRBC BLD-RTO: 0 /100 WBC
PLATELET # BLD AUTO: 232 K/UL (ref 164–446)
PMV BLD AUTO: 11.1 FL (ref 9–12.9)
POTASSIUM SERPL-SCNC: 3.4 MMOL/L (ref 3.6–5.5)
PROT SERPL-MCNC: 6.3 G/DL (ref 6–8.2)
RBC # BLD AUTO: 5.61 M/UL (ref 4.2–5.4)
SIGNIFICANT IND 70042: ABNORMAL
SITE SITE: ABNORMAL
SODIUM SERPL-SCNC: 141 MMOL/L (ref 135–145)
SOURCE SOURCE: ABNORMAL
WBC # BLD AUTO: 9.3 K/UL (ref 4.8–10.8)

## 2018-04-22 PROCEDURE — 83735 ASSAY OF MAGNESIUM: CPT

## 2018-04-22 PROCEDURE — 72156 MRI NECK SPINE W/O & W/DYE: CPT

## 2018-04-22 PROCEDURE — 70553 MRI BRAIN STEM W/O & W/DYE: CPT

## 2018-04-22 PROCEDURE — 700117 HCHG RX CONTRAST REV CODE 255: Performed by: HOSPITALIST

## 2018-04-22 PROCEDURE — 700111 HCHG RX REV CODE 636 W/ 250 OVERRIDE (IP): Performed by: HOSPITALIST

## 2018-04-22 PROCEDURE — 80053 COMPREHEN METABOLIC PANEL: CPT

## 2018-04-22 PROCEDURE — 72157 MRI CHEST SPINE W/O & W/DYE: CPT

## 2018-04-22 PROCEDURE — 99232 SBSQ HOSP IP/OBS MODERATE 35: CPT | Performed by: HOSPITALIST

## 2018-04-22 PROCEDURE — 700105 HCHG RX REV CODE 258: Performed by: HOSPITALIST

## 2018-04-22 PROCEDURE — 700102 HCHG RX REV CODE 250 W/ 637 OVERRIDE(OP): Performed by: HOSPITALIST

## 2018-04-22 PROCEDURE — 85025 COMPLETE CBC W/AUTO DIFF WBC: CPT

## 2018-04-22 PROCEDURE — 770020 HCHG ROOM/CARE - TELE (206)

## 2018-04-22 PROCEDURE — A9579 GAD-BASE MR CONTRAST NOS,1ML: HCPCS | Performed by: HOSPITALIST

## 2018-04-22 PROCEDURE — A9270 NON-COVERED ITEM OR SERVICE: HCPCS | Performed by: HOSPITALIST

## 2018-04-22 PROCEDURE — 36415 COLL VENOUS BLD VENIPUNCTURE: CPT

## 2018-04-22 RX ADMIN — ENOXAPARIN SODIUM 40 MG: 100 INJECTION SUBCUTANEOUS at 05:39

## 2018-04-22 RX ADMIN — GADODIAMIDE 15 ML: 287 INJECTION INTRAVENOUS at 15:16

## 2018-04-22 RX ADMIN — AMLODIPINE BESYLATE 5 MG: 5 TABLET ORAL at 05:39

## 2018-04-22 RX ADMIN — CEFTRIAXONE 2 G: 2 INJECTION, POWDER, FOR SOLUTION INTRAMUSCULAR; INTRAVENOUS at 20:03

## 2018-04-22 RX ADMIN — ATORVASTATIN CALCIUM 80 MG: 80 TABLET, FILM COATED ORAL at 17:36

## 2018-04-22 RX ADMIN — STANDARDIZED SENNA CONCENTRATE AND DOCUSATE SODIUM 2 TABLET: 8.6; 5 TABLET, FILM COATED ORAL at 05:39

## 2018-04-22 RX ADMIN — ASPIRIN 325 MG: 325 TABLET ORAL at 05:39

## 2018-04-22 RX ADMIN — STANDARDIZED SENNA CONCENTRATE AND DOCUSATE SODIUM 2 TABLET: 8.6; 5 TABLET, FILM COATED ORAL at 17:37

## 2018-04-22 RX ADMIN — LORAZEPAM 1 MG: 2 INJECTION INTRAMUSCULAR; INTRAVENOUS at 13:52

## 2018-04-22 RX ADMIN — SODIUM CHLORIDE: 9 INJECTION, SOLUTION INTRAVENOUS at 11:47

## 2018-04-22 ASSESSMENT — ENCOUNTER SYMPTOMS
PALPITATIONS: 0
SORE THROAT: 0
BACK PAIN: 0
EYE PAIN: 0
MEMORY LOSS: 0
SHORTNESS OF BREATH: 0
BLOOD IN STOOL: 0
CHILLS: 0
DEPRESSION: 0
CONSTIPATION: 0
BLURRED VISION: 0
SPUTUM PRODUCTION: 0
COUGH: 0
FOCAL WEAKNESS: 1
PHOTOPHOBIA: 0
STRIDOR: 0
HEMOPTYSIS: 0
TINGLING: 0
HEADACHES: 0
PND: 0
NAUSEA: 0
FEVER: 0
WEAKNESS: 1
VOMITING: 0
DOUBLE VISION: 0
SPEECH CHANGE: 0
NECK PAIN: 0
DIZZINESS: 0
MYALGIAS: 0
HEARTBURN: 0
CLAUDICATION: 0
SENSORY CHANGE: 0
ORTHOPNEA: 0
TREMORS: 0
NERVOUS/ANXIOUS: 1

## 2018-04-22 ASSESSMENT — PAIN SCALES - GENERAL
PAINLEVEL_OUTOF10: 0

## 2018-04-22 ASSESSMENT — LIFESTYLE VARIABLES: SUBSTANCE_ABUSE: 1

## 2018-04-22 NOTE — PROGRESS NOTES
Pt continues to rest in bed, no distress noted at this time. Fall precautions in place. Bed in lowest position. Call light within reach. Will continue to monitor.

## 2018-04-22 NOTE — PROGRESS NOTES
[facility] Progress Note    Date of Service: 2018    Chief Complaint  57 y.o. female admitted 2018 for evaluation of right upper and right lower extremity weakness that started one day prior to admission.    Interval Problem Update  Patient emotional and crying, poor historian. Says her left side if weak.  MRI brain pending.  Echocardiogram pending.  Neurology following, awaiting for further recommendations.      No acute events overnight, patient says she feels little better and stronger on her right side, but still not normal.  MR-Cervical/thoracic spine pending.  MRI brain pending  Echo was normal.    Consultants/Specialty  Neurology    Disposition  TBD        Review of Systems   Unable to perform ROS: Dementia   Constitutional: Positive for malaise/fatigue. Negative for chills and fever.   HENT: Negative for congestion, hearing loss, sore throat and tinnitus.    Eyes: Negative for blurred vision, double vision, photophobia and pain.   Respiratory: Negative for cough, hemoptysis, sputum production, shortness of breath and stridor.    Cardiovascular: Negative for chest pain, palpitations, orthopnea, claudication and PND.   Gastrointestinal: Negative for blood in stool, constipation, heartburn, melena, nausea and vomiting.   Genitourinary: Negative for dysuria, frequency and urgency.   Musculoskeletal: Negative for back pain, myalgias and neck pain.   Neurological: Positive for focal weakness and weakness. Negative for dizziness, tingling, tremors, sensory change, speech change and headaches.   Psychiatric/Behavioral: Positive for substance abuse. Negative for depression, memory loss and suicidal ideas. The patient is nervous/anxious.       Physical Exam  Laboratory/Imaging   Hemodynamics  Temp (24hrs), Av.4 °C (97.6 °F), Min:36.2 °C (97.2 °F), Max:36.6 °C (97.9 °F)   Temperature: 36.3 °C (97.4 °F)  Pulse  Av.5  Min: 64  Max: 91    Blood Pressure: (!) 175/97 (RN notified)       Respiratory      Respiration: 20, Pulse Oximetry: 94 %     Work Of Breathing / Effort: Mild  RUL Breath Sounds: Clear, RML Breath Sounds: Diminished, RLL Breath Sounds: Diminished, HOSSEIN Breath Sounds: Clear, LLL Breath Sounds: Diminished    Fluids    Intake/Output Summary (Last 24 hours) at 04/22/18 0924  Last data filed at 04/22/18 0500   Gross per 24 hour   Intake              480 ml   Output              710 ml   Net             -230 ml       Nutrition  Orders Placed This Encounter   Procedures   • Diet Order     Standing Status:   Standing     Number of Occurrences:   1     Order Specific Question:   Diet:     Answer:   Cardiac [6]     Physical Exam   Constitutional: She is oriented to person, place, and time. She appears well-developed and well-nourished. No distress.   HENT:   Head: Normocephalic and atraumatic.   Mouth/Throat: No oropharyngeal exudate.   Eyes: Conjunctivae are normal. Pupils are equal, round, and reactive to light. Right eye exhibits no discharge. No scleral icterus.   Neck: Neck supple. No JVD present. No thyromegaly present.   Cardiovascular: Normal rate and intact distal pulses.    No murmur heard.  Pulmonary/Chest: Effort normal and breath sounds normal. No stridor. No respiratory distress. She has no wheezes. She has no rales.   Abdominal: Soft. Bowel sounds are normal. She exhibits no distension. There is no tenderness. There is no rebound.   Musculoskeletal: She exhibits no edema.   Neurological: She is alert and oriented to person, place, and time. No cranial nerve deficit. She exhibits normal muscle tone.   Strength 5/5 Left UE/LE  Right LE 2/5, Right UE 1/5.  Right UE mild drift,       Skin: Skin is warm. She is not diaphoretic. No erythema.   Psychiatric: Her behavior is normal. Thought content normal.       Recent Labs      04/20/18   1141  04/21/18   0246  04/22/18   0238   WBC  8.0  11.2*  9.3   RBC  5.95*  5.67*  5.61*   HEMOGLOBIN  16.6*  16.0  15.6   HEMATOCRIT  51.2*   48.6*  49.2*   MCV  86.1  85.7  87.7   MCH  27.9  28.2  27.8   MCHC  32.4*  32.9*  31.7*   RDW  49.7  49.6  51.1*   PLATELETCT  240  252  232   MPV  11.2  11.3  11.1     Recent Labs      04/20/18   1141  04/21/18   0246  04/22/18   0238   SODIUM  143  139  141   POTASSIUM  3.4*  3.2*  3.4*   CHLORIDE  107  108  108   CO2  27  23  23   GLUCOSE  96  93  93   BUN  18  16  20   CREATININE  0.83  0.77  0.82   CALCIUM  9.7  9.3  9.2     Recent Labs      04/20/18   1141   APTT  30.5   INR  1.00         Recent Labs      04/21/18   0246   TRIGLYCERIDE  166*   HDL  47   LDL  104*          Assessment/Plan     * Stroke (cerebrum) (CMS-HCC)- (present on admission)   Assessment & Plan    Hx of MRI in 2013 which showed  ring-enhancing lesion never followed up  CTA Head/neck neg   MR-Cervical/thoracic spine pending.  MRI brain pending  Echo was normal.  Neurology following.  Cont aspirin, statin for neuroprotective measures.        Methamphetamine dependence (CMS-HCC)- (present on admission)   Assessment & Plan    Last use apparently 1 month ago but UTOX + meth on this admission.  Cessation counseling provided.        Hypokalemia- (present on admission)   Assessment & Plan    Replenish lytes,   K:3.4 today  CTM labs         UTI (urinary tract infection)- (present on admission)   Assessment & Plan    +UA 20-50 WBCs,   Cultures shows e-coli  IV Ceftriaxone continue x 3 days        Elevated troponin- (present on admission)   Assessment & Plan    Most likely demand from hypertensive urgency  Cont to trend. Denies chest pain        Hypertensive urgency- (present on admission)   Assessment & Plan    Will allow for permissive HTN, will use PRN labetalol if sbp >200 systolic.  Patient non compliant with medications, need re-inforcement.   Improving slowly.     Patient plan of care discussed at multidisplinary team rounds and with patient and R.N at beside.       Quality-Core Measures   Reviewed items::  Labs reviewed, Medications reviewed  and Radiology images reviewed  Stallworth catheter::  No Stallworth  DVT prophylaxis pharmacological::  Enoxaparin (Lovenox)  Ulcer Prophylaxis::  Yes

## 2018-04-22 NOTE — PROGRESS NOTES
Received report from day shift nurseAngie. Discussed plan of care. Family at bedside. Pt is resting in bed, appears to have be fatigued. Assessed fall risk, pt is able to answer and follow instructions appropriately. Pt denies pain or distress at this time. Fall precautions in place. Bed in lowest position. Call light within reach. Will continue to monitor.

## 2018-04-22 NOTE — CARE PLAN
Problem: Communication  Goal: The ability to communicate needs accurately and effectively will improve  Outcome: PROGRESSING AS EXPECTED  Discussed plan of care, reviewed meds with pt, encouraged pt to voice any questions and/or concerns regarding care.       Problem: Safety  Goal: Will remain free from falls  Outcome: PROGRESSING AS EXPECTED  Assessed fall risk, fall precautions initiated. Educated patient on use of call light, no slip socks on, bed lowest position. All needs attended to. Patient verbalized understanding. Pt calls appropriately.

## 2018-04-22 NOTE — PROGRESS NOTES
Lab called with critical result of Urine Culture at Lactose fermenting Gram negative bharti. Critical lab result read back to lab.   Dr. Starks notified of critical lab result at 1840.  Critical lab result read back by Dr. Starks.

## 2018-04-22 NOTE — CARE PLAN
Problem: Communication  Goal: The ability to communicate needs accurately and effectively will improve  Outcome: PROGRESSING AS EXPECTED  Patient communicates effectively.  All needs met. Will continue to monitor.       Problem: Safety  Goal: Will remain free from injury  Outcome: PROGRESSING AS EXPECTED  Bed in lowest, locked position.  Treaded slipper socks on, appropriate signs in place, and call light in reach.

## 2018-04-23 ENCOUNTER — APPOINTMENT (OUTPATIENT)
Dept: RADIOLOGY | Facility: MEDICAL CENTER | Age: 58
DRG: 065 | End: 2018-04-23
Attending: HOSPITALIST
Payer: MEDICAID

## 2018-04-23 PROBLEM — R74.8 ELEVATED LIVER ENZYMES: Status: ACTIVE | Noted: 2018-04-23

## 2018-04-23 LAB
ALBUMIN SERPL BCP-MCNC: 3.4 G/DL (ref 3.2–4.9)
ALBUMIN/GLOB SERPL: 1.1 G/DL
ALP SERPL-CCNC: 145 U/L (ref 30–99)
ALT SERPL-CCNC: 115 U/L (ref 2–50)
ANION GAP SERPL CALC-SCNC: 9 MMOL/L (ref 0–11.9)
AST SERPL-CCNC: 125 U/L (ref 12–45)
BASOPHILS # BLD AUTO: 0.5 % (ref 0–1.8)
BASOPHILS # BLD: 0.04 K/UL (ref 0–0.12)
BILIRUB SERPL-MCNC: 0.3 MG/DL (ref 0.1–1.5)
BUN SERPL-MCNC: 14 MG/DL (ref 8–22)
CALCIUM SERPL-MCNC: 9.3 MG/DL (ref 8.5–10.5)
CHLORIDE SERPL-SCNC: 108 MMOL/L (ref 96–112)
CO2 SERPL-SCNC: 22 MMOL/L (ref 20–33)
CREAT SERPL-MCNC: 0.58 MG/DL (ref 0.5–1.4)
EOSINOPHIL # BLD AUTO: 0.31 K/UL (ref 0–0.51)
EOSINOPHIL NFR BLD: 3.5 % (ref 0–6.9)
ERYTHROCYTE [DISTWIDTH] IN BLOOD BY AUTOMATED COUNT: 49.2 FL (ref 35.9–50)
GLOBULIN SER CALC-MCNC: 3 G/DL (ref 1.9–3.5)
GLUCOSE SERPL-MCNC: 112 MG/DL (ref 65–99)
HAV IGM SERPL QL IA: NEGATIVE
HBV CORE IGM SER QL: NEGATIVE
HBV SURFACE AG SER QL: NEGATIVE
HCT VFR BLD AUTO: 49.1 % (ref 37–47)
HCV AB SER QL: REACTIVE
HGB BLD-MCNC: 15.9 G/DL (ref 12–16)
IMM GRANULOCYTES # BLD AUTO: 0.03 K/UL (ref 0–0.11)
IMM GRANULOCYTES NFR BLD AUTO: 0.3 % (ref 0–0.9)
LYMPHOCYTES # BLD AUTO: 1.49 K/UL (ref 1–4.8)
LYMPHOCYTES NFR BLD: 17 % (ref 22–41)
MAGNESIUM SERPL-MCNC: 1.9 MG/DL (ref 1.5–2.5)
MCH RBC QN AUTO: 27.9 PG (ref 27–33)
MCHC RBC AUTO-ENTMCNC: 32.4 G/DL (ref 33.6–35)
MCV RBC AUTO: 86.1 FL (ref 81.4–97.8)
MONOCYTES # BLD AUTO: 0.49 K/UL (ref 0–0.85)
MONOCYTES NFR BLD AUTO: 5.6 % (ref 0–13.4)
NEUTROPHILS # BLD AUTO: 6.4 K/UL (ref 2–7.15)
NEUTROPHILS NFR BLD: 73.1 % (ref 44–72)
NRBC # BLD AUTO: 0 K/UL
NRBC BLD-RTO: 0 /100 WBC
PLATELET # BLD AUTO: 223 K/UL (ref 164–446)
PMV BLD AUTO: 11.1 FL (ref 9–12.9)
POTASSIUM SERPL-SCNC: 3.4 MMOL/L (ref 3.6–5.5)
PROT SERPL-MCNC: 6.4 G/DL (ref 6–8.2)
RBC # BLD AUTO: 5.7 M/UL (ref 4.2–5.4)
SODIUM SERPL-SCNC: 139 MMOL/L (ref 135–145)
WBC # BLD AUTO: 8.8 K/UL (ref 4.8–10.8)

## 2018-04-23 PROCEDURE — A9270 NON-COVERED ITEM OR SERVICE: HCPCS | Performed by: HOSPITALIST

## 2018-04-23 PROCEDURE — 80074 ACUTE HEPATITIS PANEL: CPT

## 2018-04-23 PROCEDURE — 36415 COLL VENOUS BLD VENIPUNCTURE: CPT

## 2018-04-23 PROCEDURE — 700102 HCHG RX REV CODE 250 W/ 637 OVERRIDE(OP): Performed by: HOSPITALIST

## 2018-04-23 PROCEDURE — 87522 HEPATITIS C REVRS TRNSCRPJ: CPT

## 2018-04-23 PROCEDURE — 700105 HCHG RX REV CODE 258: Performed by: HOSPITALIST

## 2018-04-23 PROCEDURE — 80053 COMPREHEN METABOLIC PANEL: CPT

## 2018-04-23 PROCEDURE — 83735 ASSAY OF MAGNESIUM: CPT

## 2018-04-23 PROCEDURE — G8978 MOBILITY CURRENT STATUS: HCPCS | Mod: CK

## 2018-04-23 PROCEDURE — 99232 SBSQ HOSP IP/OBS MODERATE 35: CPT | Performed by: HOSPITALIST

## 2018-04-23 PROCEDURE — 700111 HCHG RX REV CODE 636 W/ 250 OVERRIDE (IP): Performed by: HOSPITALIST

## 2018-04-23 PROCEDURE — G8988 SELF CARE GOAL STATUS: HCPCS | Mod: CI

## 2018-04-23 PROCEDURE — 97166 OT EVAL MOD COMPLEX 45 MIN: CPT

## 2018-04-23 PROCEDURE — 97162 PT EVAL MOD COMPLEX 30 MIN: CPT

## 2018-04-23 PROCEDURE — G8979 MOBILITY GOAL STATUS: HCPCS | Mod: CI

## 2018-04-23 PROCEDURE — G8987 SELF CARE CURRENT STATUS: HCPCS | Mod: CK

## 2018-04-23 PROCEDURE — 770020 HCHG ROOM/CARE - TELE (206)

## 2018-04-23 PROCEDURE — 85025 COMPLETE CBC W/AUTO DIFF WBC: CPT

## 2018-04-23 RX ORDER — POTASSIUM CHLORIDE 20 MEQ/1
40 TABLET, EXTENDED RELEASE ORAL ONCE
Status: COMPLETED | OUTPATIENT
Start: 2018-04-23 | End: 2018-04-23

## 2018-04-23 RX ADMIN — HYDRALAZINE HYDROCHLORIDE 10 MG: 20 INJECTION INTRAMUSCULAR; INTRAVENOUS at 07:59

## 2018-04-23 RX ADMIN — ENALAPRILAT 1.25 MG: 1.25 INJECTION INTRAVENOUS at 12:54

## 2018-04-23 RX ADMIN — POTASSIUM CHLORIDE 40 MEQ: 1500 TABLET, EXTENDED RELEASE ORAL at 10:44

## 2018-04-23 RX ADMIN — SODIUM CHLORIDE: 9 INJECTION, SOLUTION INTRAVENOUS at 02:18

## 2018-04-23 RX ADMIN — AMLODIPINE BESYLATE 5 MG: 5 TABLET ORAL at 05:24

## 2018-04-23 RX ADMIN — ENOXAPARIN SODIUM 40 MG: 100 INJECTION SUBCUTANEOUS at 05:25

## 2018-04-23 RX ADMIN — STANDARDIZED SENNA CONCENTRATE AND DOCUSATE SODIUM 2 TABLET: 8.6; 5 TABLET, FILM COATED ORAL at 05:24

## 2018-04-23 RX ADMIN — STANDARDIZED SENNA CONCENTRATE AND DOCUSATE SODIUM 2 TABLET: 8.6; 5 TABLET, FILM COATED ORAL at 17:39

## 2018-04-23 RX ADMIN — ATORVASTATIN CALCIUM 80 MG: 80 TABLET, FILM COATED ORAL at 17:39

## 2018-04-23 RX ADMIN — ASPIRIN 325 MG: 325 TABLET ORAL at 05:24

## 2018-04-23 RX ADMIN — SODIUM CHLORIDE: 9 INJECTION, SOLUTION INTRAVENOUS at 15:48

## 2018-04-23 ASSESSMENT — ENCOUNTER SYMPTOMS
MEMORY LOSS: 0
CONSTIPATION: 0
NERVOUS/ANXIOUS: 1
ORTHOPNEA: 0
SPEECH CHANGE: 0
DEPRESSION: 0
FEVER: 0
BLURRED VISION: 0
SHORTNESS OF BREATH: 0
SORE THROAT: 0
CHILLS: 0
SENSORY CHANGE: 0
WEAKNESS: 1
CLAUDICATION: 0
HEADACHES: 0
STRIDOR: 0
HEMOPTYSIS: 0
SPUTUM PRODUCTION: 0
DIZZINESS: 0
PHOTOPHOBIA: 0
MYALGIAS: 0
TINGLING: 0
BLOOD IN STOOL: 0
TREMORS: 0
HEARTBURN: 0
NECK PAIN: 0
PALPITATIONS: 0
COUGH: 0
VOMITING: 0
NAUSEA: 0
BACK PAIN: 0
DOUBLE VISION: 0
PND: 0
FOCAL WEAKNESS: 1
EYE PAIN: 0

## 2018-04-23 ASSESSMENT — PAIN SCALES - GENERAL
PAINLEVEL_OUTOF10: 0

## 2018-04-23 ASSESSMENT — COGNITIVE AND FUNCTIONAL STATUS - GENERAL
SUGGESTED CMS G CODE MODIFIER DAILY ACTIVITY: CK
STANDING UP FROM CHAIR USING ARMS: A LITTLE
WALKING IN HOSPITAL ROOM: A LOT
EATING MEALS: A LITTLE
MOBILITY SCORE: 16
TURNING FROM BACK TO SIDE WHILE IN FLAT BAD: A LITTLE
DRESSING REGULAR UPPER BODY CLOTHING: A LOT
TOILETING: A LOT
HELP NEEDED FOR BATHING: A LOT
CLIMB 3 TO 5 STEPS WITH RAILING: A LOT
PERSONAL GROOMING: A LITTLE
MOVING TO AND FROM BED TO CHAIR: A LITTLE
DAILY ACTIVITIY SCORE: 14
SUGGESTED CMS G CODE MODIFIER MOBILITY: CK
DRESSING REGULAR LOWER BODY CLOTHING: A LOT
MOVING FROM LYING ON BACK TO SITTING ON SIDE OF FLAT BED: A LITTLE

## 2018-04-23 ASSESSMENT — GAIT ASSESSMENTS
GAIT LEVEL OF ASSIST: MODERATE ASSIST
ASSISTIVE DEVICE: FRONT WHEEL WALKER
DISTANCE (FEET): 5

## 2018-04-23 ASSESSMENT — LIFESTYLE VARIABLES: SUBSTANCE_ABUSE: 1

## 2018-04-23 ASSESSMENT — ACTIVITIES OF DAILY LIVING (ADL): TOILETING: INDEPENDENT

## 2018-04-23 NOTE — PROGRESS NOTES
Pt continues to rest in bed, no distress noted at this time. Neuro checks done. Fall precautions in place. Bed in lowest position. Call light within reach. Will continue to monitor.

## 2018-04-23 NOTE — PROGRESS NOTES
Received report from day shift nurse, Monse. Pt is resting in bed, lethargic, family at bedside. Family is anxious regarding updates on plan of care. Education and reassurance provided, family verbalized understanding and apologized for being agitated. Pt denies any pain or distress at this time. NIH scale performed at bedside. Assessed current fluids. Fall precautions in place. Bed in lowest position. Call light within reach. Will continue to monitor.

## 2018-04-23 NOTE — CARE PLAN
Problem: Communication  Goal: The ability to communicate needs accurately and effectively will improve  Outcome: PROGRESSING AS EXPECTED  Discussed plan of care, reviewed meds with pt, encouraged pt to voice any questions and/or concerns regarding care.       Problem: Safety  Goal: Will remain free from falls  Outcome: PROGRESSING AS EXPECTED  Assessed fall risk, fall precautions initiated. Educated patient on use of call light, no slip socks on, bed lowest position. All needs attended to. Patient verbalized understanding.

## 2018-04-23 NOTE — PROGRESS NOTES
Pt continues to rest in bed, no complaints and/or distress noted at this time. Fall precautions in place. Bed in lowest position. Call light within reach. Will continue to monitor.

## 2018-04-23 NOTE — THERAPY
"Occupational Therapy Evaluation completed.   Functional Status:  Pt s/p acute left thalamic infarct, demonstrates moderate to significant decline with ADLs. Pt presents with impaired RUE strength, coordination and tone, impaired balance. Performed bed mobility with mod a, STS from eob with min a, required [place] assist intermittently to maintain  on FWW, LB dressing mod a, UB dressing min/mod a. Pt would benefit from acute skilled services and pt appears to be a good candidate for acute rehab given pt's age and performance during session today. Pt's family present during session, supportive and engaged in education during session.   Plan of Care: Will benefit from Occupational Therapy 4 times per week  Discharge Recommendations:  Equipment: Will Continue to Assess for Equipment Needs.     See \"Rehab Therapy-Acute\" Patient Summary Report for complete documentation.    "

## 2018-04-23 NOTE — PROGRESS NOTES
CC: Stroke    Date of Admission: 4/20/2018    Today's Date: 04/22/18    Consulting Physician: Leonides Starks M.D.      Overnight events:   No acute events overnight. Patient continues to have right arm hemiparesis, which seems worse to her. Her right leg is also weak, but not as much as her arm. Speech is okay.       ROS:   Constitutional: No fevers or chills.  Eyes: No blurry vision or eye pain.  ENT: No dysphagia or hearing loss.  Respiratory: No cough or shortness of breath.  Cardiovascular: No chest pain or palpitations.  GI: No nausea, vomiting, or diarrhea.  : No urinary incontinence or dysuria.  Musculoskeletal: No joint swelling or arthralgias.  Skin: No skin rashes.  Neuro: No headaches, dizziness, or tremors.  Endocrine: No heat or cold intolerance. No polydipsia or polyuria.  Psych: No depression or anxiety.  Heme/Lymph: No easy bruising or swollen lymph nodes.  All other systems were reviewed and were negative.     Past Medical History:   Past Medical History:   Diagnosis Date   • Arthritis    • Hypertension    • Stroke (CMS-HCC)        Past Surgical History: History reviewed. No pertinent surgical history.    Social History:   Social History     Social History   • Marital status:      Spouse name: N/A   • Number of children: N/A   • Years of education: N/A     Occupational History   • Not on file.     Social History Main Topics   • Smoking status: Current Every Day Smoker     Packs/day: 1.00     Years: 35.00     Types: Cigarettes   • Smokeless tobacco: Never Used   • Alcohol use No   • Drug use: No   • Sexual activity: Not on file     Other Topics Concern   • Not on file     Social History Narrative   • No narrative on file       Family History:   Family History   Problem Relation Age of Onset   • Arthritis Mother    • Diabetes Mother        Allergies: No Known Allergies      Current Facility-Administered Medications:   •  enalaprilat (VASOTEC) injection 1.25 mg, 1.25 mg, Intravenous,  Q6HRS PRN, Abbey Bustamante M.D.  •  hydrALAZINE (APRESOLINE) injection 10 mg, 10 mg, Intravenous, Q6HRS PRN, Abbey Bustamante M.D.  •  amLODIPine (NORVASC) tablet 5 mg, 5 mg, Oral, DAILY, Danny Swift M.D., 5 mg at 04/22/18 0539  •  senna-docusate (PERICOLACE or SENOKOT S) 8.6-50 MG per tablet 2 Tab, 2 Tab, Oral, BID, 2 Tab at 04/22/18 1737 **AND** polyethylene glycol/lytes (MIRALAX) PACKET 1 Packet, 1 Packet, Oral, QDAY PRN **AND** magnesium hydroxide (MILK OF MAGNESIA) suspension 30 mL, 30 mL, Oral, QDAY PRN **AND** bisacodyl (DULCOLAX) suppository 10 mg, 10 mg, Rectal, QDAY PRN, Danny Swift M.D.  •  Respiratory Care per Protocol, , Nebulization, Continuous RT, Danny Swift M.D.  •  NS infusion, , Intravenous, Continuous, Danny Swift M.D., Last Rate: 75 mL/hr at 04/22/18 1147  •  enoxaparin (LOVENOX) inj 40 mg, 40 mg, Subcutaneous, DAILY, Danny Swift M.D., 40 mg at 04/22/18 0539  •  acetaminophen (TYLENOL) tablet 650 mg, 650 mg, Oral, Q6HRS PRN, Danny Swift M.D.  •  atorvastatin (LIPITOR) tablet 80 mg, 80 mg, Oral, Q EVENING, Danny Swift M.D., 80 mg at 04/22/18 1736  •  aspirin (ASA) tablet 325 mg, 325 mg, Oral, DAILY, 325 mg at 04/22/18 0539 **OR** aspirin (ASA) chewable tab 324 mg, 324 mg, Oral, DAILY **OR** aspirin (ASA) suppository 300 mg, 300 mg, Rectal, DAILY, Danny Swift M.D.  •  ondansetron (ZOFRAN) syringe/vial injection 4 mg, 4 mg, Intravenous, Q4HRS PRN, Danny Swift M.D.  •  ondansetron (ZOFRAN ODT) dispertab 4 mg, 4 mg, Oral, Q4HRS PRN, Danny Swift M.D.  •  promethazine (PHENERGAN) tablet 12.5-25 mg, 12.5-25 mg, Oral, Q4HRS PRN, Danny Swift M.D.  •  promethazine (PHENERGAN) suppository 12.5-25 mg, 12.5-25 mg, Rectal, Q4HRS PRN, Danny Swift M.D.  •  prochlorperazine (COMPAZINE) injection 5-10 mg, 5-10 mg, Intravenous, Q4HRS PRN, Danny Swift M.D.  •  labetalol (NORMODYNE,TRANDATE) injection  "10-20 mg, 10-20 mg, Intravenous, Q4HRS PRN, Danny Swift M.D., 10 mg at 04/21/18 6899    Imaging:   Brain w/wo contrast   1.  Small sized acute left thalamic infarct.  2.  Moderate microangiopathic ischemic change versus demyelination or gliosis.  3.  Innumerable tiny foci of chronic hemosiderin deposition which could be seen in the setting of amyloid angiopathy, versus chronic hypertensive microhemorrhage.  4.  Chronic bilateral basal ganglia lacunar infarcts.  5.  Findings of sinusitis as described above.    Echocardiogram  CONCLUSIONS  Compared to the images of the study done 9/27/2013 - there has been no   significant change.  Normal left ventricular size and systolic function.  No evidence of right to left shunt by agitated saline challenge.    Physical Exam:   Ambulatory Vitals  Encounter Vitals  Temperature: 36.4 °C (97.5 °F)  Temp Source: Temporal  Blood Pressure: 145/100 (RN notified)  BP Location: Left, Upper Arm  Patient BP Position: Supine  Pulse: 90  Respiration: 18  Pulse Oximetry: 96 %  O2 (LPM): 0  O2 Delivery: None (Room Air)  Weight: 70.9 kg (156 lb 4.9 oz)  How Weight Obtained: Bed Scale  Reason weight was either estimated or stated: Non-weight bearing  Height: 154.9 cm (5' 1\")    Physical Exam:   Constitutional: Well-developed, well-nourished,  female, daughter at bedside. Appears stated age.  Cardiovascular: RRR, with no murmurs, rubs or gallops. No carotid bruits. No peripheral edema, pedal pulses intact.   Respiratory: Lungs CTA B/L, no W/R/R. Respiratory effort appears normal.    Skin: Warm, dry, intact. No rashes observed. Multiple tattoos.  Eyes: Sclera anicteric. No eyelid ptosis.  Abdomen: Soft NTTP. No hepatosplenomegaly.   ENMT: Good dentition.  Oropharynx clear.   Extremities: No swelling or edema.  Neck: Supple, no thyromegaly.  Neurologic:              Mental Status: Awake, alert, oriented to person, place, and time.              Speech: Fluent. Able to name and " repeat.              Memory: Able to recall recent and remote events accurately.               Concentration: Able to focus on history and follow multi-step commands.                         Fund of Knowledge: Appropriate              Cranial Nerves:                           CN II: PERRL. No afferent pupillary defect.                          CN III, IV, VI: Good eye closure, EOMI.                           CN V: Facial sensation intact and symmetric.                           CN VII: No facial asymmetry.                           CN VIII: Hearing intact.                           CN IX and X: Palate elevates symmetrically. Normal gag reflex.                          CN XI: Symmetric shoulder shrug.                           CN XII: Tongue midline.               Sensory: Intact light touch.              Coordination: Decreased coordination in right hand with finger tapping.                  DTR's: 2+ throughout without clonus.               Babinski: Toes downgoing bilaterally.              Romberg: Negative.              Movements: No tremors observed.   Musculoskeletal:               Strength: Right upper extremity very mild drift, unable to lift right leg fully. 5/5 on the left.              Gait: Deferred              Tone: Normal bulk and tone.              Joints: No swelling in the joints of hands or knees.      1a. LOC: 0  1b. LOC Questions: 0  1c. LOC Commands: 0  2. Best Gaze:0  3. Visual Fields: 0  4. Facial Paresis: 0  5a. Motor arm left: 0  5b. Motor arm right: 2  6a. Motor leg left: 0  6b. Motor leg right: 1  7. Sensory: 1  8. Best Language: 0  9. Limb Ataxia: 1  10. Dysarthria: 0  11. Extinction/Inattention: 0    NIH 5         Labs:  Recent Labs      04/20/18   1141  04/21/18   0246  04/22/18   0238   WBC  8.0  11.2*  9.3   RBC  5.95*  5.67*  5.61*   HEMOGLOBIN  16.6*  16.0  15.6   HEMATOCRIT  51.2*  48.6*  49.2*   MCV  86.1  85.7  87.7   MCH  27.9  28.2  27.8   RDW  49.7  49.6  51.1*   PLATELETCT   240  252  232   MPV  11.2  11.3  11.1   NEUTSPOLYS  74.60*  76.50*  75.10*   LYMPHOCYTES  16.80*  14.70*  16.00*   MONOCYTES  5.30  4.70  5.40   EOSINOPHILS  2.60  3.30  2.60   BASOPHILS  0.50  0.40  0.50       Recent Labs      04/20/18   1141  04/21/18   0246  04/22/18   0238   SODIUM  143  139  141   POTASSIUM  3.4*  3.2*  3.4*   CHLORIDE  107  108  108   CO2  27  23  23   GLUCOSE  96  93  93   BUN  18  16  20         Imaging:     Assessment/Plan:  Stroke (cerebrum) (CMS-HCC)  .    UTI (urinary tract infection)  .    Elevated troponin  .    Hypertensive urgency  .    Hypokalemia  .    Methamphetamine dependence (CMS-HCC)  .      58 yo F with methamphetamine abuse, utox positive, and prior stroke, presents with new left thalamic ischemic stroke on MRI after acute onset right face/arm/leg weakness. There was a question as to whether her prior MRI findings, a ring enhancing lesion, was due to stroke vs. An alternative pathology, but her new MRI imaging supports an ischemic stroke process, most likely small vessel disease as a result of her methamphetamine use. There are chronic microbleeds which are likely from chronically elevated hypertension as well. Echocardiogram negative.     Plan:  1. Stop methamphetamines - needs long term counseling  2. Control BP to normotensive range  3. Continue ASA and statin  4. PT/OT for right U and L hemiplegia.   5. Neurology will be signing off as her stroke workup is complete.         Vanessa Sarabia D.O., M.P.H  MS specialist.   Board Certified Neurologist.  Neurology Clerkship Director, [facility].    Neurology,  [facility].   Tel: [phone]  Fax: [phone]

## 2018-04-23 NOTE — PROGRESS NOTES
Patient resting in bed. Family at the bedside. Patient had an episode of epistaxis. No longer bleeding. Bed in low locked position, call bell within reach. Continue to monitor.

## 2018-04-23 NOTE — PROGRESS NOTES
[facility]ist Progress Note    Date of Service: 4/23/2018    Chief Complaint  57 y.o. female admitted 4/20/2018 for evaluation of right upper and right lower extremity weakness that started one day prior to admission.    Interval Problem Update  Patient emotional and crying, poor historian. Says her left side if weak.  MRI brain pending.  Echocardiogram pending.  Neurology following, awaiting for further recommendations.    4/22  No acute events overnight, patient says she feels little better and stronger on her right side, but still not normal.  MR-Cervical/thoracic spine pending.  MRI brain pending  Echo was normal.    4/23  No acute issues overnight. Patient reports mild improvement in her feel and weakness of her right extremities  MRI brain shows Small sized acute left thalamic infarct.  Neurology has signed off  PT/OT pending and most likely placement.    Consultants/Specialty  Neurology    Disposition  TBD        Review of Systems   Unable to perform ROS: Dementia   Constitutional: Positive for malaise/fatigue. Negative for chills and fever.   HENT: Negative for congestion, hearing loss, sore throat and tinnitus.    Eyes: Negative for blurred vision, double vision, photophobia and pain.   Respiratory: Negative for cough, hemoptysis, sputum production, shortness of breath and stridor.    Cardiovascular: Negative for chest pain, palpitations, orthopnea, claudication and PND.   Gastrointestinal: Negative for blood in stool, constipation, heartburn, melena, nausea and vomiting.   Genitourinary: Negative for dysuria, frequency and urgency.   Musculoskeletal: Negative for back pain, myalgias and neck pain.   Neurological: Positive for focal weakness and weakness. Negative for dizziness, tingling, tremors, sensory change, speech change and headaches.   Psychiatric/Behavioral: Positive for substance abuse. Negative for depression, memory loss and suicidal ideas. The patient is nervous/anxious.       Physical  Exam  Laboratory/Imaging   Hemodynamics  Temp (24hrs), Av.2 °C (97.1 °F), Min:35.9 °C (96.6 °F), Max:36.4 °C (97.5 °F)   Temperature: 35.9 °C (96.6 °F)  Pulse  Av.3  Min: 64  Max: 92    Blood Pressure: (!) 170/95 (RN notified)      Respiratory      Respiration: 18, Pulse Oximetry: 94 %     Work Of Breathing / Effort: Mild  RUL Breath Sounds: Clear, RML Breath Sounds: Diminished, RLL Breath Sounds: Diminished, HOSSEIN Breath Sounds: Clear, LLL Breath Sounds: Diminished    Fluids    Intake/Output Summary (Last 24 hours) at 18 0749  Last data filed at 18 1800   Gross per 24 hour   Intake              500 ml   Output             1000 ml   Net             -500 ml       Nutrition  Orders Placed This Encounter   Procedures   • Diet Order     Standing Status:   Standing     Number of Occurrences:   1     Order Specific Question:   Diet:     Answer:   Cardiac [6]     Physical Exam   Constitutional: She is oriented to person, place, and time. She appears well-developed and well-nourished. No distress.   HENT:   Head: Normocephalic and atraumatic.   Mouth/Throat: No oropharyngeal exudate.   Eyes: Conjunctivae are normal. Pupils are equal, round, and reactive to light. Right eye exhibits no discharge. No scleral icterus.   Neck: Neck supple. No JVD present. No thyromegaly present.   Cardiovascular: Normal rate and intact distal pulses.    No murmur heard.  Pulmonary/Chest: Effort normal and breath sounds normal. No stridor. No respiratory distress. She has no wheezes. She has no rales.   Abdominal: Soft. Bowel sounds are normal. She exhibits no distension. There is no tenderness. There is no rebound.   Musculoskeletal: She exhibits no edema.   Neurological: She is alert and oriented to person, place, and time. No cranial nerve deficit. She exhibits normal muscle tone.   Strength 5/5 Left UE/LE  Right LE 2/5, Right UE 1-2/5.  Right UE mild drift,         Skin: Skin is warm. She is not diaphoretic. No erythema.    Psychiatric: Her behavior is normal. Thought content normal.       Recent Labs      04/21/18   0246  04/22/18   0238  04/23/18   0152   WBC  11.2*  9.3  8.8   RBC  5.67*  5.61*  5.70*   HEMOGLOBIN  16.0  15.6  15.9   HEMATOCRIT  48.6*  49.2*  49.1*   MCV  85.7  87.7  86.1   MCH  28.2  27.8  27.9   MCHC  32.9*  31.7*  32.4*   RDW  49.6  51.1*  49.2   PLATELETCT  252  232  223   MPV  11.3  11.1  11.1     Recent Labs      04/21/18   0246  04/22/18   0238  04/23/18   0152   SODIUM  139  141  139   POTASSIUM  3.2*  3.4*  3.4*   CHLORIDE  108  108  108   CO2  23  23  22   GLUCOSE  93  93  112*   BUN  16  20  14   CREATININE  0.77  0.82  0.58   CALCIUM  9.3  9.2  9.3     Recent Labs      04/20/18   1141   APTT  30.5   INR  1.00         Recent Labs      04/21/18   0246   TRIGLYCERIDE  166*   HDL  47   LDL  104*          Assessment/Plan     Stroke (cerebrum) (CMS-[facility])  Hx of MRI in 2013 which showed  ring-enhancing lesion never followed up  CTA Head/neck neg   MR-Cervical/thoracic neg for acute processes  MRI brain shows Small sized acute left thalamic infarct.  Echo was normal.  Neurology signed off  Cont aspirin, statin for neuroprotective measures.    UTI (urinary tract infection)  +UA 20-50 WBCs,   Cultures shows e-coli  IV Ceftriaxone continue x 3 days    Elevated troponin  Most likely demand from hypertensive urgency  Denies chest pain      Hypertensive urgency  Permissive HTN allowed,  Now have restarted norvasc, titrate as needed  PRN hydralazine if SBP >180    Hypokalemia  Replenish lytes,   K:3.4 today  CTM labs     Methamphetamine dependence (CMS-HCC)  Last use apparently 1 month ago but UTOX + meth on this admission.  Cessation counseling provided.    Elevated liver enzymes  Normal on admission,  Possible medication effect?  Hep panel ordered.  Liver ultrasound pending     Patient plan of care discussed at multidisplinary team rounds and with patient and R.N at beside.    Quality-Core Measures   Reviewed  items::  Labs reviewed, Medications reviewed and Radiology images reviewed  Stallworth catheter::  No Stallworth  DVT prophylaxis pharmacological::  Enoxaparin (Lovenox)  Ulcer Prophylaxis::  Yes

## 2018-04-23 NOTE — ASSESSMENT & PLAN NOTE
Painless therefore doubt ischemic injury  Normal on admission, have been rising since statin was started, suspected medication related  Hep panel with positive Hep C Ab.(Patient states she has known about this in the past however says she is cleared the disease)  Check viral load (Hep C)  Check tylenol level  Liver ultrasound negative for e/o obstruction  Will DC statin and repeat LFTs in AM

## 2018-04-23 NOTE — PROGRESS NOTES
Report received from night rn. No issues overnight. Patient hypertensive. PRN given per order. Patient did c/o SOB. Vitals stable. Dr. Starks made aware. NIH scale performed. Neuro checks q 4 hrs. Bed in low locked position, call bell within reach. Family at the bedside. Continue to monitor.

## 2018-04-23 NOTE — PROGRESS NOTES
Spoke to SW about patient's refusal to be transferred to an inpatient Rehab facility. Patient would like to be outpatient options or home services. Discussed with family that transitional care will come speak with them. Will continue to monitor.

## 2018-04-24 ENCOUNTER — APPOINTMENT (OUTPATIENT)
Dept: RADIOLOGY | Facility: MEDICAL CENTER | Age: 58
DRG: 065 | End: 2018-04-24
Attending: HOSPITALIST
Payer: MEDICAID

## 2018-04-24 ENCOUNTER — HOSPITAL ENCOUNTER (INPATIENT)
Facility: REHABILITATION | Age: 58
End: 2018-04-24
Attending: PHYSICAL MEDICINE & REHABILITATION | Admitting: PHYSICAL MEDICINE & REHABILITATION
Payer: MEDICAID

## 2018-04-24 LAB
ALBUMIN SERPL BCP-MCNC: 3.7 G/DL (ref 3.2–4.9)
ALBUMIN/GLOB SERPL: 1.2 G/DL
ALP SERPL-CCNC: 185 U/L (ref 30–99)
ALT SERPL-CCNC: 168 U/L (ref 2–50)
ANION GAP SERPL CALC-SCNC: 9 MMOL/L (ref 0–11.9)
APAP SERPL-MCNC: <10 UG/ML (ref 10–30)
AST SERPL-CCNC: 133 U/L (ref 12–45)
BASOPHILS # BLD AUTO: 0.3 % (ref 0–1.8)
BASOPHILS # BLD: 0.03 K/UL (ref 0–0.12)
BILIRUB SERPL-MCNC: 0.5 MG/DL (ref 0.1–1.5)
BUN SERPL-MCNC: 14 MG/DL (ref 8–22)
CALCIUM SERPL-MCNC: 9.3 MG/DL (ref 8.5–10.5)
CHLORIDE SERPL-SCNC: 108 MMOL/L (ref 96–112)
CO2 SERPL-SCNC: 21 MMOL/L (ref 20–33)
CREAT SERPL-MCNC: 0.68 MG/DL (ref 0.5–1.4)
EOSINOPHIL # BLD AUTO: 0.33 K/UL (ref 0–0.51)
EOSINOPHIL NFR BLD: 3.8 % (ref 0–6.9)
ERYTHROCYTE [DISTWIDTH] IN BLOOD BY AUTOMATED COUNT: 48.5 FL (ref 35.9–50)
GLOBULIN SER CALC-MCNC: 3 G/DL (ref 1.9–3.5)
GLUCOSE SERPL-MCNC: 99 MG/DL (ref 65–99)
HCT VFR BLD AUTO: 50.9 % (ref 37–47)
HGB BLD-MCNC: 17.1 G/DL (ref 12–16)
IMM GRANULOCYTES # BLD AUTO: 0.05 K/UL (ref 0–0.11)
IMM GRANULOCYTES NFR BLD AUTO: 0.6 % (ref 0–0.9)
LYMPHOCYTES # BLD AUTO: 1.4 K/UL (ref 1–4.8)
LYMPHOCYTES NFR BLD: 15.9 % (ref 22–41)
MAGNESIUM SERPL-MCNC: 1.9 MG/DL (ref 1.5–2.5)
MCH RBC QN AUTO: 28.4 PG (ref 27–33)
MCHC RBC AUTO-ENTMCNC: 33.6 G/DL (ref 33.6–35)
MCV RBC AUTO: 84.4 FL (ref 81.4–97.8)
MONOCYTES # BLD AUTO: 0.48 K/UL (ref 0–0.85)
MONOCYTES NFR BLD AUTO: 5.5 % (ref 0–13.4)
NEUTROPHILS # BLD AUTO: 6.49 K/UL (ref 2–7.15)
NEUTROPHILS NFR BLD: 73.9 % (ref 44–72)
NRBC # BLD AUTO: 0 K/UL
NRBC BLD-RTO: 0 /100 WBC
PLATELET # BLD AUTO: 210 K/UL (ref 164–446)
PMV BLD AUTO: 11.6 FL (ref 9–12.9)
POTASSIUM SERPL-SCNC: 4 MMOL/L (ref 3.6–5.5)
PROT SERPL-MCNC: 6.7 G/DL (ref 6–8.2)
RBC # BLD AUTO: 6.03 M/UL (ref 4.2–5.4)
SODIUM SERPL-SCNC: 138 MMOL/L (ref 135–145)
WBC # BLD AUTO: 8.8 K/UL (ref 4.8–10.8)

## 2018-04-24 PROCEDURE — 83735 ASSAY OF MAGNESIUM: CPT

## 2018-04-24 PROCEDURE — 700102 HCHG RX REV CODE 250 W/ 637 OVERRIDE(OP): Performed by: HOSPITALIST

## 2018-04-24 PROCEDURE — A9270 NON-COVERED ITEM OR SERVICE: HCPCS | Performed by: HOSPITALIST

## 2018-04-24 PROCEDURE — 36415 COLL VENOUS BLD VENIPUNCTURE: CPT

## 2018-04-24 PROCEDURE — 770020 HCHG ROOM/CARE - TELE (206)

## 2018-04-24 PROCEDURE — 80307 DRUG TEST PRSMV CHEM ANLYZR: CPT

## 2018-04-24 PROCEDURE — 99232 SBSQ HOSP IP/OBS MODERATE 35: CPT | Mod: 25 | Performed by: INTERNAL MEDICINE

## 2018-04-24 PROCEDURE — 700111 HCHG RX REV CODE 636 W/ 250 OVERRIDE (IP): Performed by: HOSPITALIST

## 2018-04-24 PROCEDURE — 700111 HCHG RX REV CODE 636 W/ 250 OVERRIDE (IP): Performed by: INTERNAL MEDICINE

## 2018-04-24 PROCEDURE — 99407 BEHAV CHNG SMOKING > 10 MIN: CPT | Performed by: INTERNAL MEDICINE

## 2018-04-24 PROCEDURE — 80053 COMPREHEN METABOLIC PANEL: CPT

## 2018-04-24 PROCEDURE — 700105 HCHG RX REV CODE 258: Performed by: HOSPITALIST

## 2018-04-24 PROCEDURE — 85025 COMPLETE CBC W/AUTO DIFF WBC: CPT

## 2018-04-24 PROCEDURE — 76705 ECHO EXAM OF ABDOMEN: CPT

## 2018-04-24 PROCEDURE — 87522 HEPATITIS C REVRS TRNSCRPJ: CPT

## 2018-04-24 RX ORDER — ASPIRIN 325 MG
325 TABLET ORAL DAILY
Qty: 100 TAB
Start: 2018-04-25

## 2018-04-24 RX ORDER — AMLODIPINE BESYLATE 10 MG/1
10 TABLET ORAL DAILY
Qty: 30 TAB | Refills: 2
Start: 2018-04-24

## 2018-04-24 RX ADMIN — ASPIRIN 325 MG: 325 TABLET ORAL at 05:14

## 2018-04-24 RX ADMIN — SODIUM CHLORIDE: 9 INJECTION, SOLUTION INTRAVENOUS at 04:01

## 2018-04-24 RX ADMIN — ENOXAPARIN SODIUM 40 MG: 100 INJECTION SUBCUTANEOUS at 05:14

## 2018-04-24 RX ADMIN — CEFTRIAXONE 2 G: 2 INJECTION, POWDER, FOR SOLUTION INTRAMUSCULAR; INTRAVENOUS at 10:32

## 2018-04-24 RX ADMIN — STANDARDIZED SENNA CONCENTRATE AND DOCUSATE SODIUM 2 TABLET: 8.6; 5 TABLET, FILM COATED ORAL at 05:14

## 2018-04-24 RX ADMIN — AMLODIPINE BESYLATE 5 MG: 5 TABLET ORAL at 05:14

## 2018-04-24 ASSESSMENT — ENCOUNTER SYMPTOMS
DEPRESSION: 1
HEADACHES: 0
PHOTOPHOBIA: 0
CONSTIPATION: 0
CLAUDICATION: 0
SORE THROAT: 0
BLOOD IN STOOL: 0
SHORTNESS OF BREATH: 0
EYE PAIN: 0
TINGLING: 0
FEVER: 0
BACK PAIN: 0
TREMORS: 0
BLURRED VISION: 0
DOUBLE VISION: 0
NAUSEA: 0
VOMITING: 0
SPUTUM PRODUCTION: 0
COUGH: 0
MEMORY LOSS: 0
MYALGIAS: 0
HEARTBURN: 0
PND: 0
WEAKNESS: 1
HEMOPTYSIS: 0
NECK PAIN: 0
FOCAL WEAKNESS: 1
SPEECH CHANGE: 0
STRIDOR: 0
DIZZINESS: 0
NERVOUS/ANXIOUS: 1
PALPITATIONS: 0
ORTHOPNEA: 0
CHILLS: 0
SENSORY CHANGE: 0

## 2018-04-24 ASSESSMENT — PAIN SCALES - GENERAL
PAINLEVEL_OUTOF10: 0

## 2018-04-24 ASSESSMENT — LIFESTYLE VARIABLES: SUBSTANCE_ABUSE: 1

## 2018-04-24 NOTE — PREADMISSION SCREENING NOTE
Pre-Admission Screening Form    Patient Information:   Name: Lindsay Polanco     MRN: [id number]       : 1960      Age: 57 y.o.   Gender: female      Race:  or  [3]       Marital Status:  [4]  Family Contact: Carmen Marin Leonard Ramirez, Jerri        Relationship: Daughter [2]  Brother [1]  Daughter [2]  Home Phone: [phone]           Cell Phone:       Advanced Directives: None  Code Status:  FULL  Current Attending Provider: Catrachita Sun D.O.  Referring Physician: Dr. Starks      Physiatrist Consult: Dr. Pulido        Referral Date: 2018  Primary Payor Source:  MEDICAID [id number]  Secondary Payor Source:      Medical Information:   Date of Admission to Acute Care [place]  Room Number: T705/00  Rehabilitation Diagnosis: 01.2 (R) Body Involvement (L) Brain   Small sized acute left thalamic infarct  Immunization History   Administered Date(s) Administered   • Hep A/HEP B Combined Vaccine ([employer]) 2006, 06/15/2006, 2007   • Influenza, Unspecified 2006, 2007   • Pneumococcal polysaccharide vaccine (PPSV-23) 2006   • TD Vaccine 2006     No Known Allergies  Past Medical History:   Diagnosis Date   • Arthritis    • Hypertension    • Stroke (CMS-[facility])      History reviewed. No pertinent surgical history.    History Leading to Admission, Conditions that Caused the Need for Rehab (CMS):        Danny Swift M.D. Physician Signed [facility]  H&P Date of Service: 2018  8:07 PM           CHIEF COMPLAINT:  Right upper extremity and right lower extremity weakness.     HISTORY OF PRESENT ILLNESS:  The patient is a 57-year-old female who developed   right upper and right lower extremity weakness yesterday morning.  She   presented to her local emergency room in [place].  She was noted to have   significantly elevated blood pressure and mildly elevated troponin.  She was   treated for  hypertension.  They discuss her elevated troponin with Dr. Villegas   who did not feel that she needed inpatient workup as her repeat troponin   remained the same and stressed good blood pressure management and outpatient   followup with him.  The patient is an extremely poor historian.  I am not sure   if she had a focal deficit yesterday as the records from the outlying   emergency room referred that she has generalized weakness.  No family was   present at the bedside during my interview.  The patient is very emotionally   upset and crying during the whole interview.  She is not very cooperative with   history and review of systems.  She denies any chest pain.  She denies any   headache.  She initially denied any drug use; however, when I told her that   her records reported a history of methamphetamine use, she said that her last   use was about a month ago.  She had an MRI in our facility in September 2013   with a ring-enhancing lesion and followup MRI was recommended; however,   apparently the patient has not had any followup on this.  Her weakness is   about the same.  She denies any trauma.  She has not been compliant with her   blood pressure or statin medications.        ASSESSMENT:  1.  Hypertensive urgency.  2.  Elevated troponin, likely related to her hypertensive urgency and   methamphetamine abuse.  3.  Right hemiparesis, possible stroke.  4.  Noncompliance with medical therapy.  5.  Urinary tract infection.     PLAN:  The patient will be admitted and monitored on telemetry.  I will start   her on amlodipine and p.r.n. labetalol.  We will try to keep her systolic   blood pressure to 160 to 180 range given concern for possible stroke.     We will start her on aspirin.     We will check an MRI of the brain.     We will check an echocardiogram.     She has been evaluated by Dr. Sarabia from neurology who recommended MRI with   and without contrast.     We will trend her troponins.     Replete her  potassium.     Patient was counseled on methamphetamine cessation and importance of   compliance with medical therapy.     She will be started on atorvastatin.     We will ask for PT and OT evaluations.     We will start her on Lovenox for DVT prophylaxis.     She will likely require more than 2 midnights stay for treatment of her   medical condition.        ____________________________________     SHANNA RUBÉN SHARPE MD     GB / [facility]     DD:  04/20/2018 20:07:25    Vanessa Sarabia D.O. Physician Signed Neurology  Consults Date of Service: 4/20/2018  1:44 PM      Expand All Collapse All      CC: Right arm and leg weakness        Assessment/Plan:  56 yo F with pmhx of htn, hld, methamphetamine abuse and prior episode of left sided weakness in 2013 who had a ring enhancing lesion thought to be attributable to an acute stroke, who now presents with right arm and leg weakness and dysarthria since yesterday. She is outside of the time window for alteplase. I would like her to undergo CTA of the Head and Neck to rule out any acute arterial occlusions and be admitted for a brain MRI w/wo contrast.      Recommendation:   MRI of the brain w/wo contrast  CTA Head and NEck  Echo  A1c, Lipid Profile, Sed Rate  PT/OT/Speech     Vanessa Sarabia D.O., M.P.H  MS specialist.   Board Certified Neurologist.  Neurology Clerkship Director, [facility].    Neurology,  [facility].   Tel: [phone]  Fax: [phone]         Crystal Pulido M.D. Physician Signed Physical Medicine & Rehab  Consults Date of Service: 4/24/2018  9:55 AM   Consult Orders:   IP CONSULT FOR PHYSIATRY [[id number]] ordered by Leonides Starks M.D. at 04/24/18 0916      Expand All Collapse All      Medical chart review completed.      Patient is a 57 y.o. year-old female with a past medical history significant for arthritis, HLD, renal failure, possible previous CVA,  and HTN admitted to [facility] on 4/20/2018 11:04 AM with right UE and LE weakness on 4/19/18.  She initially presented to ED in [place] and noted to have elevated BP and mildly elevated troponins.  Per report patient has history of methamphetamine abuse as well as possible stroke in 2013. CT showed chronic bilateral basal ganglia lacunar infarcts.  CTA without evidence of acute abnormality.  She was transferred to [facility] on 4/20/18.  Neurology was consulted and recommended MRI w/wo contrast as well as echo.  MRI brain revealed left thalamic infarct.  Echo was unremarkable.  Currently on Ceftriaxone for 3 doses for E. Coli UTI.  Statin has been held for elevated liver enzymes.      Patient was previously independent living alone in 1 story home; per report children can stay with her as needed.  Patient was evaluated by PT on 4/23/18 and found to be modA for bed mobility and modA for gait.  Patient was evaluated by OT on 4/23/18 and found to be min-modA for ADLs.                 PSYCHOSOCIAL HISTORY:  Living Site:  Home  Living With:  self  Caregiver's availability:  Family  Number of stairs:  0  Substance use history:  Methamphetamine         The patient presents  with cognitive deficits and functional deficits in mobility/self-cares/swallowing/speech, and Moderate  de-conditioning. Pre-morbidly, this patient lived in a single level home with None steps to enter,alone and able to care for self  The patient was evaluated by acute care Physical Therapy, Occupational Therapy and Speech Language Pathology; currently requiring moderate assistance for mobility and moderate assistance for ADLs, also with ongoing cognitive and swallowing deficits. The patient's current diet is NPO with no liquids.      The patient is   a Good candidate for an acute inpatient rehabilitation program with a coordinated program of care at an intensity and frequency not available at a lower level of care.      Note: This recommendation  requires that patient has at least CGA/Minimal Assistance needs in at least two therapy disciplines.  If patient progresses to no longer need CGA/[place] with at least two therapy disciplines they may be more appropriate for Skilled nursing facility versus home with home health.       This recommendation is substantiated by the patient's current medical condition with intervention and assessment of medical issues requiring an acute level of care for patient's safety and maximum outcome. A coordinated program of care will be provided by an interdisciplinary team including physical therapy, occupational therapy, speech language pathology, hospitalist, physiatry, rehab nursing and rehab psychology. Rehab goals include improved cognition and swallowing, mobility, self-care management, strength and conditioning/endurance, pain management, bowel and bladder management, mood and affect, and safety with independent home management including caregiver training. Estimated length of stay is approximately 14-21 days. Rehab potential: Fair. Disposition: to pre-morbid independent living setting with supportive care of patient's family. We will continue to follow with you in anticipation of discharge to acute inpatient rehabilitation when medically stable to do so at the discretion of the attending physician. Thank you for allowing us to participate in this patient's care. Please call with any questions regarding this recommendation.     Crystal Pulido M.D.       Co-morbidities: as listed above and below.  Potential Risk - Complications: Aphasia, Cognitive Impairment, Contractures, Deep Vein Thrombosis, Dysphagia, Incontinence, Malnutrition, Pain, Paralysis, Perceptual Impairment, Pneumonia, Pressure Ulcer and Urinary Tract Infection  Level of Risk: High    Ongoing Medical Management Needed (Medical/Nursing Needs):   Patient Active Problem List    Diagnosis Date Noted   • Stroke (cerebrum) (CMS-[facility]) 04/20/2018     Priority:  "High   • Elevated liver enzymes 04/23/2018   • Hypertensive urgency 04/20/2018   • Elevated troponin 04/20/2018   • UTI (urinary tract infection) 04/20/2018   • Hypokalemia 04/20/2018   • Methamphetamine dependence (CMS-HCC) 04/20/2018   • ARF (acute renal failure) (CMS-[facility]) 10/22/2013   • HTN (hypertension) 09/27/2013   • Tobacco abuse 09/27/2013   • Dyslipidemia 09/27/2013   • History of drug abuse 09/27/2013       Current Vital Signs:   Temperature: 36.4 °C (97.6 °F) Pulse: 78 Respiration: 19 Blood Pressure: (!) 163/110 (RN notified)  Weight: 71.1 kg (156 lb 12 oz) Height: 154.9 cm (5' 1\")  Pulse Oximetry: 94 % O2 (LPM): 0      Completed Laboratory Reports:  Recent Labs      04/22/18   0238  04/23/18   0152  04/24/18   0210   WBC  9.3  8.8  8.8   HEMOGLOBIN  15.6  15.9  17.1*   HEMATOCRIT  49.2*  49.1*  50.9*   PLATELETCT  232  223  210   SODIUM  141  139  138   POTASSIUM  3.4*  3.4*  4.0   BUN  20  14  14   CREATININE  0.82  0.58  0.68   ALBUMIN  3.6  3.4  3.7   GLUCOSE  93  112*  99     HISTORY/REASON FOR EXAM:  Right-sided hemiparesis..      TECHNIQUE/EXAM DESCRIPTION:  MRI of the brain without and with contrast, with diffusion.    The study was performed on a Revinate Signa 1.5 Hazel MRI scanner. Spoiled-GRASS sagittal, thin-section T2 axial, T1 coronal, T1 postcontrast coronal, T1 postcontrast axial, FLAIR coronal and diffusion-weighted axial images were obtained of the whole brain.    15 mL Omniscan contrast were administered intravenously.    COMPARISON:  9/27/2013.    FINDINGS:  The cortical sulci and gyri are within normal limits. The ventricular system is within normal limits. The bony calvaria are intact. There is no evidence of extra-axial fluid collection or intracranial mass effect.    There is a small sized acute left thalamic infarct. There is a pattern of moderate supratentorial white matter disease with patchy and focal areas of bright T2 and FLAIR signal in the subcortical and deep white matter of " both hemispheres consistent with   small vessel ischemic change versus demyelination or gliosis. There are innumerable tiny foci of gradient echo signal hypointensity throughout the brain.    There are chronic bilateral basal ganglia lacunar infarcts. There is no evidence of abnormal diffusion-weighted signal intensity in the brain. There is no evidence of abnormal enhancement in the brain parenchyma.    There are normal flow voids in the cavernous carotid arteries and M1 segments. There are normal flow voids in the distal vertebral basilar system. There are normal flow voids in the dural venous sinuses. There is left maxillary sinus mucosal thickening.   There is sphenoid sinus mucosal thickening. The mastoid air cells and remaining paranasal sinuses are well aerated.   Impression       1.  Small sized acute left thalamic infarct.  2.  Moderate microangiopathic ischemic change versus demyelination or gliosis.  3.  Innumerable tiny foci of chronic hemosiderin deposition which could be seen in the setting of amyloid angiopathy, versus chronic hypertensive microhemorrhage.  4.  Chronic bilateral basal ganglia lacunar infarcts.  5.  Findings of sinusitis as described above.    Attempts to convey these findings to Dr. SHANNA SHARPE were initiated on 4/22/2018 at 4:29 PM. These findings were discussed with Dr. Starks for  SHANNA SHARPE on 4/22/2018 4:35 PM.   Reading Provider Reading Date   Emeka Tamayo M.D. Apr 22, 2018       Additional Labs: Not Applicable    Prior Living Situation:   Housing / Facility: 1 Story Apartment / Condo  Steps Into Home: 0  Steps In Home: 0  Lives with - Patient's Self Care Capacity: Alone and Able to Care For Self  Equipment Owned: None    Prior Level of Function / Living Situation:   Physical Therapy: Prior Services: None  Housing / Facility: 1 Story Apartment / Condo  Steps Into Home: 0  Steps In Home: 0  Bathroom Set up: Walk In Shower  Equipment Owned: None  Lives with  - Patient's Self Care Capacity: Alone and Able to Care For Self  Bed Mobility: Independent  Transfer Status: Independent  Ambulation: Independent  Distance Ambulation (Feet):  (community)  Assistive Devices Used: None  Stairs: Independent  Current Level of Function:   Level Of Assist: Moderate Assist  Assistive Device: Front Wheel Walker  Distance (Feet): 5 (with manual stabilization of R, manual R advance. )  Deviation:  (R foot drop, R knee weak, trouble advancing R )  # of Stairs Climbed: 0  Weight Bearing Status: no restrictions  Supine to Sit: Moderate Assist  Sit to Supine:  (up in chair)  Scooting: Minimal Assist  Rolling: Minimal Assist to Rt.  Sit to Stand: Minimal Assist  Bed, Chair, Wheelchair Transfer: Minimal Assist  Transfer Method: Stand Pivot  Sitting in Chair: 15+  Sitting Edge of Bed: 10  Standin  Occupational Therapy:   Self Feeding: Independent  Grooming / Hygiene: Independent  Bathing: Independent  Dressing: Independent  Toileting: Independent  Medication Management: Independent  Laundry: Independent  Kitchen Mobility: Independent  Finances: Independent  Home Management: Independent  Shopping: Independent  Prior Level Of Mobility: Independent Without Device in Community  Driving / Transportation: Driving Independent  Prior Services: None  Housing / Facility: 1 Story Apartment / Condo  Occupation (Pre-Hospital Vocational): Employed Full Time (Manager at a gas station)  Current Level of Function:   Eating: Supervision (after s/u, will need assistance with cutting up solid food)  Bathing: Not Tested  Upper Body Dressing: Moderate Assist  Lower Body Dressing: Maximal Assist  Toileting: Maximal Assist  Speech Language Pathology:      Rehabilitation Prognosis/Potential: Good  Estimated Length of Stay: 14-21 days    Nursing:   Orientation : Oriented x 4  Continent    Scope/Intensity of Services Recommended:  Physical Therapy: 1 hr / day  5 days / week. Therapeutic Interventions Required: Maximize  Endurance, Mobility, Strength and Safety  Occupational Therapy: 1 hr / day 5 days / week. Therapeutic Interventions Required: Maximize Self Care, ADLs, IADLs and Energy Conservation  Speech & Language Pathology: 1 hr / day 5 days / week. Therapeutic Interventions Required: Maximize Cognition, Safety and expression swallow.  Rehabilitation Nursin/7. Therapeutic Interventions Required: Monitor Skin, Vital Signs, Intake and Output, Labs, Safety and Family Training  Rehabilitation Physician: 3 - 5 days / week. Therapeutic Interventions Required: Medical Management  Respiratory Care: evaluate . Therapeutic Interventions Required: Pulmonary Toileting  Dietician: consult . Therapeutic Interventions Required: nutritional need.    Rehabilitation Goals and Plan (Expected frequency & duration of treatment in the IRF):   Return to the Community and Minimal Assist Level of Care  Anticipated Date of Rehabilitation Admission: 2018  Patient/Family oriented IRF level of care/facility/plan: Yes  Patient/Family willing to participate in IRF care/facility/plan: Yes  Patient able to tolerate IRF level of care proposed: Yes  Patient has potential to benefit IRF level of care proposed: Yes  Comments: Not Applicable    Special Needs or Precautions - Medical Necessity:  Safety Concerns/Precautions:  Fall Risk / High Risk for Falls, Balance and Cognition  Cardiac Precautions  Current Medications:    Current Facility-Administered Medications Ordered in Epic   Medication Dose Route Frequency Provider Last Rate Last Dose   • enalaprilat (VASOTEC) injection 1.25 mg  1.25 mg Intravenous Q6HRS PRN Abbey Bustamante M.D.   1.25 mg at 18 1254   • hydrALAZINE (APRESOLINE) injection 10 mg  10 mg Intravenous Q6HRS PRN Abbey Bustamante M.D.   10 mg at 18 0759   • amLODIPine (NORVASC) tablet 5 mg  5 mg Oral DAILY Danny Swift M.D.   5 mg at 18 0520   • senna-docusate (PERICOLACE or SENOKOT S) 8.6-50 MG per tablet 2 Tab   2 Tab Oral BID Danny Swift M.D.   2 Tab at 04/24/18 0514    And   • polyethylene glycol/lytes (MIRALAX) PACKET 1 Packet  1 Packet Oral QDAY PRN Danny Swift M.D.        And   • magnesium hydroxide (MILK OF MAGNESIA) suspension 30 mL  30 mL Oral QDAY PRN Danny Swift M.D.        And   • bisacodyl (DULCOLAX) suppository 10 mg  10 mg Rectal QDAY PRN Danny Swift M.D.       • Respiratory Care per Protocol   Nebulization Continuous RT Danny Swift M.D.       • enoxaparin (LOVENOX) inj 40 mg  40 mg Subcutaneous DAILY Danny Swift M.D.   40 mg at 04/24/18 0514   • acetaminophen (TYLENOL) tablet 650 mg  650 mg Oral Q6HRS PRN Danny Swift M.D.       • aspirin (ASA) tablet 325 mg  325 mg Oral DAILY Danny Swift M.D.   325 mg at 04/24/18 0514    Or   • aspirin (ASA) chewable tab 324 mg  324 mg Oral DAILY Danny Swift M.D.        Or   • aspirin (ASA) suppository 300 mg  300 mg Rectal DAILY Danny Swift M.D.       • ondansetron (ZOFRAN) syringe/vial injection 4 mg  4 mg Intravenous Q4HRS PRN Danny Swift M.D.       • ondansetron (ZOFRAN ODT) dispertab 4 mg  4 mg Oral Q4HRS PRN Danny Swift M.D.       • promethazine (PHENERGAN) tablet 12.5-25 mg  12.5-25 mg Oral Q4HRS PRN Danny Swift M.D.       • promethazine (PHENERGAN) suppository 12.5-25 mg  12.5-25 mg Rectal Q4HRS PRN Danny Swift M.D.       • prochlorperazine (COMPAZINE) injection 5-10 mg  5-10 mg Intravenous Q4HRS PRN Danny Swift M.D.       • labetalol (NORMODYNE,TRANDATE) injection 10-20 mg  10-20 mg Intravenous Q4HRS PRN Danny Swift M.D.   10 mg at 04/21/18 2103     Current Outpatient Prescriptions Ordered in [facility]   Medication Sig Dispense Refill   • [START ON 4/25/2018] aspirin (ASA) 325 MG Tab Take 1 Tab by mouth every day. 100 Tab    • amLODIPine (NORVASC) 10 MG Tab Take 1 Tab by mouth every day. 30 Tab 2     Diet:   DIET ORDERS (Through  next 24h)    Start     Ordered    04/24/18 1030  DIET ORDER  ALL MEALS     Question:  Diet:  Answer:  Cardiac    04/24/18 1029          Anticipated Discharge Destination / Patient/Family Goal:  Destination: Home with Assistance Support System: Family   Anticipated home health services: OT, PT, SLP, Nursing, Social Work and Aide  Previously used HH service/ provider: Not Applicable  Anticipated DME Needs: Walker and Wheelchair  Outpatient Services: OT and PT  Alternative resources to address additional identified needs:     Pre-Screen Completed: 4/24/2018 2:34 PM Paulino Ruiz

## 2018-04-24 NOTE — CONSULTS
Medical chart review completed.     Patient is a 57 y.o. year-old female with a past medical history significant for arthritis, HLD, renal failure, possible previous CVA, and HTN admitted to [facility] on 4/20/2018 11:04 AM with right UE and LE weakness on 4/19/18.  She initially presented to ED in [place] and noted to have elevated BP and mildly elevated troponins.  Per report patient has history of methamphetamine abuse as well as possible stroke in 2013. CT showed chronic bilateral basal ganglia lacunar infarcts.  CTA without evidence of acute abnormality.  She was transferred to [facility] on 4/20/18.  Neurology was consulted and recommended MRI w/wo contrast as well as echo.  MRI brain revealed left thalamic infarct.  Echo was unremarkable.  Currently on Ceftriaxone for 3 doses for E. Coli UTI.  Statin has been held for elevated liver enzymes.     Patient was previously independent living alone in 1 story home; per report children can stay with her as needed.  Patient was evaluated by PT on 4/23/18 and found to be modA for bed mobility and modA for gait.  Patient was evaluated by OT on 4/23/18 and found to be min-modA for ADLs.       PMH:  Past Medical History:   Diagnosis Date   • Arthritis    • Hypertension    • Stroke (CMS-HCC)        PSH:  History reviewed. No pertinent surgical history.    FAMILY HISTORY:  Family History   Problem Relation Age of Onset   • Arthritis Mother    • Diabetes Mother        MEDICATIONS:  Current Facility-Administered Medications   Medication Dose   • cefTRIAXone (ROCEPHIN) syringe 2 g  2 g   • enalaprilat (VASOTEC) injection 1.25 mg  1.25 mg   • hydrALAZINE (APRESOLINE) injection 10 mg  10 mg   • amLODIPine (NORVASC) tablet 5 mg  5 mg   • senna-docusate (PERICOLACE or SENOKOT S) 8.6-50 MG per tablet 2 Tab  2 Tab    And   • polyethylene glycol/lytes (MIRALAX) PACKET 1 Packet  1 Packet    And   • magnesium hydroxide (MILK OF MAGNESIA) suspension 30 mL  30 mL    And   •  bisacodyl (DULCOLAX) suppository 10 mg  10 mg   • Respiratory Care per Protocol     • enoxaparin (LOVENOX) inj 40 mg  40 mg   • acetaminophen (TYLENOL) tablet 650 mg  650 mg   • aspirin (ASA) tablet 325 mg  325 mg    Or   • aspirin (ASA) chewable tab 324 mg  324 mg    Or   • aspirin (ASA) suppository 300 mg  300 mg   • ondansetron (ZOFRAN) syringe/vial injection 4 mg  4 mg   • ondansetron (ZOFRAN ODT) dispertab 4 mg  4 mg   • promethazine (PHENERGAN) tablet 12.5-25 mg  12.5-25 mg   • promethazine (PHENERGAN) suppository 12.5-25 mg  12.5-25 mg   • prochlorperazine (COMPAZINE) injection 5-10 mg  5-10 mg   • labetalol (NORMODYNE,TRANDATE) injection 10-20 mg  10-20 mg       ALLERGIES:  Patient has no known allergies.    PSYCHOSOCIAL HISTORY:  Living Site:  Home  Living With:  self  Caregiver's availability:  Family  Number of stairs:  0  Substance use history:  Methamphetamine       The patient presents  with cognitive deficits and functional deficits in mobility/self-cares/swallowing/speech, and Moderate  de-conditioning. Pre-morbidly, this patient lived in a single level home with None steps to enter,alone and able to care for self  The patient was evaluated by acute care Physical Therapy, Occupational Therapy and Speech Language Pathology; currently requiring moderate assistance for mobility and moderate assistance for ADLs, also with ongoing cognitive and swallowing deficits. The patient's current diet is NPO with no liquids.     The patient is   a Good candidate for an acute inpatient rehabilitation program with a coordinated program of care at an intensity and frequency not available at a lower level of care.     Note: This recommendation requires that patient has at least CGA/Minimal Assistance needs in at least two therapy disciplines.  If patient progresses to no longer need CGA/[place] with at least two therapy disciplines they may be more appropriate for Skilled nursing facility versus home with home health.       This recommendation is substantiated by the patient's current medical condition with intervention and assessment of medical issues requiring an acute level of care for patient's safety and maximum outcome. A coordinated program of care will be provided by an interdisciplinary team including physical therapy, occupational therapy, speech language pathology, hospitalist, physiatry, rehab nursing and rehab psychology. Rehab goals include improved cognition and swallowing, mobility, self-care management, strength and conditioning/endurance, pain management, bowel and bladder management, mood and affect, and safety with independent home management including caregiver training. Estimated length of stay is approximately 14-21 days. Rehab potential: Fair. Disposition: to pre-morbid independent living setting with supportive care of patient's family. We will continue to follow with you in anticipation of discharge to acute inpatient rehabilitation when medically stable to do so at the discretion of the attending physician. Thank you for allowing us to participate in this patient's care. Please call with any questions regarding this recommendation.    Crystal Pulido M.D.

## 2018-04-24 NOTE — DISCHARGE PLANNING
PMR referral from [facility].    Small sized acute left thalamic infarct ongoing medical management as well as therapy need. Anticipate post acute services to facilitate a successful transition to community home with family support. Dr. Pulido to consult per protocol.

## 2018-04-24 NOTE — PROGRESS NOTES
Patient resting in bed. Patient is feeling frustrated and emotional. Family at the bedside. Bed in low locked position. Call bell within reach. Continue to monitor.

## 2018-04-24 NOTE — PROGRESS NOTES
Patient asleep in bed. Family at the bedside. Bed in low locked position, call bell within reach. Continue to monitor.

## 2018-04-24 NOTE — PROGRESS NOTES
Patient resting in bed. Family at the bedside. Vitals stable. No acute changes. Bed in low locked position, call bell within reach. Continue to monitor.

## 2018-04-24 NOTE — PROGRESS NOTES
[facility] Hospitalist Progress Note    Date of Service: 2018    Chief Complaint  57 y.o. female with a history of meth and tobacco abuse, hypertension admitted 2018 for evaluation of right-sided weakness found to have acute CVA    Interval Problem Update  : MRI confirmed acute CVA in the left thalamus, echo normal  : Neurology consulted, aspirin and statin started.  LFTs increasing.  PTSD recommended placement  : LFTs still rising, painless.  Hep C Ab positive, viral load ordered.  RUQ US neg aside from e/o cholecystectomy    Consultants/Specialty  Neurology    Disposition  Follow-up outpatient PTOT options, potential DC in a.m.        Review of Systems   Unable to perform ROS: Dementia   Constitutional: Positive for malaise/fatigue. Negative for chills and fever.   HENT: Negative for congestion, hearing loss, sore throat and tinnitus.    Eyes: Negative for blurred vision, double vision, photophobia and pain.   Respiratory: Negative for cough, hemoptysis, sputum production, shortness of breath and stridor.    Cardiovascular: Negative for chest pain, palpitations, orthopnea, claudication and PND.   Gastrointestinal: Negative for blood in stool, constipation, heartburn, melena, nausea and vomiting.   Genitourinary: Negative for dysuria, frequency and urgency.   Musculoskeletal: Negative for back pain, myalgias and neck pain.   Neurological: Positive for focal weakness and weakness. Negative for dizziness, tingling, tremors, sensory change, speech change and headaches.   Psychiatric/Behavioral: Positive for depression and substance abuse. Negative for memory loss and suicidal ideas. The patient is nervous/anxious.       Physical Exam  Laboratory/Imaging   Hemodynamics  Temp (24hrs), Av.5 °C (97.7 °F), Min:36.2 °C (97.2 °F), Max:36.7 °C (98.1 °F)   Temperature: 36.4 °C (97.6 °F)  Pulse  Av.9  Min: 64  Max: 92    Blood Pressure: (!) 163/110 (RN notified)      Respiratory      Respiration: 19,  Pulse Oximetry: 94 %     Work Of Breathing / Effort: Mild  RUL Breath Sounds: Clear, RML Breath Sounds: Clear, RLL Breath Sounds: Diminished, HOSSEIN Breath Sounds: Clear, LLL Breath Sounds: Diminished    Fluids    Intake/Output Summary (Last 24 hours) at 04/24/18 1334  Last data filed at 04/24/18 0600   Gross per 24 hour   Intake             1180 ml   Output                0 ml   Net             1180 ml       Nutrition  Orders Placed This Encounter   Procedures   • DIET ORDER     Standing Status:   Standing     Number of Occurrences:   1     Order Specific Question:   Diet:     Answer:   Cardiac [6]     Physical Exam   Constitutional: She is oriented to person, place, and time. She appears well-developed and well-nourished. No distress.   HENT:   Head: Normocephalic and atraumatic.   Mouth/Throat: No oropharyngeal exudate.   Eyes: Conjunctivae are normal. Pupils are equal, round, and reactive to light. Right eye exhibits no discharge. No scleral icterus.   Neck: Neck supple. No JVD present. No thyromegaly present.   Cardiovascular: Normal rate and intact distal pulses.    No murmur heard.  Pulmonary/Chest: Effort normal and breath sounds normal. No stridor. No respiratory distress. She has no wheezes. She has no rales.   Abdominal: Soft. Bowel sounds are normal. She exhibits no distension. There is no tenderness. There is no rebound.   Musculoskeletal: She exhibits no edema.   Neurological: She is alert and oriented to person, place, and time. No cranial nerve deficit. She exhibits normal muscle tone.   Strength 5/5 Left UE/LE  Right LE 2/5, Right UE 1-2/5.  Right UE mild drift   Skin: Skin is warm. She is not diaphoretic. No erythema.   Psychiatric: Her behavior is normal. Thought content normal.       Recent Labs      04/22/18   0238  04/23/18   0152  04/24/18   0210   WBC  9.3  8.8  8.8   RBC  5.61*  5.70*  6.03*   HEMOGLOBIN  15.6  15.9  17.1*   HEMATOCRIT  49.2*  49.1*  50.9*   MCV  87.7  86.1  84.4   MCH  27.8   27.9  28.4   MCHC  31.7*  32.4*  33.6   RDW  51.1*  49.2  48.5   PLATELETCT  232  223  210   MPV  11.1  11.1  11.6     Recent Labs      04/22/18   0238  04/23/18   0152  04/24/18   0210   SODIUM  141  139  138   POTASSIUM  3.4*  3.4*  4.0   CHLORIDE  108  108  108   CO2  23  22  21   GLUCOSE  93  112*  99   BUN  20  14  14   CREATININE  0.82  0.58  0.68   CALCIUM  9.2  9.3  9.3                      Assessment/Plan     Stroke (cerebrum) (CMS-HCC)  Hx of MRI in 2013 which showed  ring-enhancing lesion never followed up.  She has no swallow dysfunction.  MR-Cervical/thoracic neg for acute processes  MRI brain shows Small sized acute left thalamic infarct and bilateral lacunar infarcts  Echo was normal.  Neurology signed off  Cont aspirin  Discontinue statin due to transaminitis, this was discussed with patient regarding risks outweighing benefits for secondary prevention of CVA  She was recommended skilled nursing however the patient does not want this option, will provide outpatient PTOT prescription to be done in [place]    UTI (urinary tract infection)  +UA 20-50 WBCs,   Cultures shows e-coli  Completed IV Ceftriaxone    Elevated troponin  Most likely demand from hypertensive urgency, no ongoing increase  Denies chest pain      Hypertensive urgency  Permissive HTN allowed for 72h  Increase amlodipine to 10 daily  PRN hydralazine if SBP >180    Hypokalemia  Replace PO  Repeat in a.m.    Methamphetamine dependence (CMS-HCC)  She states Last use apparently 1 month ago but UTOX + meth on this admission.  Cessation counseling provided.  She denies any IV use    Elevated liver enzymes  Painless therefore doubt ischemic injury  Normal on admission, have been rising since statin was started, suspected medication related  Hep panel with positive Hep C Ab.(Patient states she has known about this in the past however says she is cleared the disease)  Check viral load (Hep C)  Check tylenol level  Liver ultrasound negative for  e/o obstruction  Will DC statin and repeat LFTs in AM    Tobacco abuse  -counseled for more than 10 minutes on tobacco cessation [id number]       Quality-Core Measures   Reviewed items::  Labs reviewed, Medications reviewed and Radiology images reviewed  Stallworth catheter::  No Stallworth  DVT prophylaxis pharmacological::  Enoxaparin (Lovenox)  Ulcer Prophylaxis::  Yes

## 2018-04-24 NOTE — CARE PLAN
Problem: Safety  Goal: Will remain free from injury  Outcome: PROGRESSING AS EXPECTED  Use stronger side when getting out of bed

## 2018-04-24 NOTE — DISCHARGE PLANNING
Anticipated Discharge Disposition: Home with outpatient PT/OT    Action: LSW met with pt and pt daughter bedside. Pt reports she does not want to go to any facility and will have 24/7 caretakers from her daughters and other family.    Barriers to Discharge: None    Plan: Pt to dc home with Outpatient PT/OT and family support when medically clear .

## 2018-04-24 NOTE — DISCHARGE SUMMARY
CHIEF COMPLAINT ON ADMISSION  Chief Complaint   Patient presents with   • Weakness     R side weakness since 4/19 0200   • Slurred Speech     since last night   • Facial Droop     R side since 4/19       CODE STATUS  Full Code    HPI & HOSPITAL COURSE  This is a 57 y.o. year old female with Hx of Methamphetamine use,  here an acute CVA. Patent's initial symptoms included right upper and lower extremity weakness and uncontrolled hypertension. On admission she underwent a CTA head and neck which was negative. Patient was placed on aspirin and statin for neuro protective measures. She underwent an MRI of the brain and cervical and thoracic spine which revealed a left thalamic infarct. unfortunately possible causes include use of meth and possible hypertensive emergency as the cause. We also performed an echocardiogram which was unremarkable and consulted neurology for additional support. Nevertheless patient does report feeling marginally better and strength has improved from beginning of her hospitalization. We have also actively titrated her blood pressure medications.  She was doing well regarding swallowing however did fill her control with a dysphagia 2 diet.     She was noted to have increasing LFT levels throughout her stay after being started on atorvastatin.  Right upper quadrant ultrasound was negative for any evidence of gallstones or obstruction. She is status post cholecystectomy. Hepatitis antibodies were checked and found to be positive hep C antibody. Quantitative viral load was ordered however still pending at time of discharge.  She had no right upper quadrant discomfort to suggest acute hepatitis, and also this made ischemic injury much less likely. She had no evidence of fluid overload to suggest hepatic congestion. When these other etiologies were ruled out her statin was discontinued. Her LFTs were still slightly trending up at the time of discharge however the patient was completely symptomatic  and very anxious to discharge. She was instructed to have her liver function tests repeated when she follows up with her primary care physician within one week. This will be done to ensure they peak and trended down without the statin. The stroke protecting benefit of statin was discussed with the patient as well as the preference to have her on a statin medication however, with rising LFTs thought to be related to the statin medication, risks were thought to outweigh the benefits.      At this point patient will be discharged for continued physical rehabilitation will be done as an outpatient.    Therefore, she is discharged in fair and stable condition for further post-acute management.     SPECIFIC OUTPATIENT FOLLOW-UP  [facility] in 1-2 weeks  PCP In 1-2 weeks    DISCHARGE PROBLEM LIST  Principal Problem:    Stroke (cerebrum) (CMS-HCC) POA: Yes  Active Problems:    Hypertensive urgency POA: Yes    Elevated troponin POA: Yes    UTI (urinary tract infection) POA: Yes    Hypokalemia POA: Yes    Methamphetamine dependence (CMS-HCC) POA: Yes    Elevated liver enzymes POA: Unknown  Resolved Problems:    * No resolved hospital problems. *      FOLLOW UP  She'll follow with her primary care physician within one week and discharge    MEDICATIONS ON DISCHARGE   Lindsay Polanco   Home Medication Instructions BALTAZAR:[id number]    Printed on:04/25/18 8497   Medication Information                      amLODIPine (NORVASC) 10 MG Tab  Take 1 Tab by mouth every day.             aspirin (ASA) 325 MG Tab  Take 1 Tab by mouth every day.                 DIET  Orders Placed This Encounter   Procedures   • DIET NPO     Standing Status:   Standing     Number of Occurrences:   1     Order Specific Question:   Restrict to:     Answer:   Sips with Medications [3]     Comments:   liver us       ACTIVITY  As tolerated.      LINES, DRAINS, AND WOUNDS  This is an automated list. Peripheral IVs will be removed prior to discharge.  PIV  Group Left Antecubital 20g Flexible Catheter (Active)   Line Secured Taped;Transparent 4/23/2018  8:00 PM   Site Condition / Description Assessed;Patent;Clean;Dry;Intact 4/23/2018  8:00 PM   Dressing Type / Description Transparent;Clean;Dry;Intact;Occlusive 4/23/2018  8:00 PM   Dressing Status Observed 4/23/2018  8:00 PM   Saline Locked Yes 4/23/2018  8:00 PM   Infiltration Grading Used by [facility] 0 4/23/2018  8:00 PM   Phlebitis Scale (Used by [facility]) 0 4/23/2018  8:00 PM   Date Primary Tubing Changed 04/21/18 4/23/2018  8:00 PM   NEXT Primary Tubing Change  04/24/18 4/23/2018  8:00 PM                     MENTAL STATUS ON TRANSFER  Level of Consciousness: Alert  Orientation : Oriented x 4  Speech: Speech Clear    CONSULTATIONS  Neurology    PROCEDURES  None    LABORATORY  Lab Results   Component Value Date/Time    SODIUM 138 04/24/2018 02:10 AM    POTASSIUM 4.0 04/24/2018 02:10 AM    CHLORIDE 108 04/24/2018 02:10 AM    CO2 21 04/24/2018 02:10 AM    GLUCOSE 99 04/24/2018 02:10 AM    BUN 14 04/24/2018 02:10 AM    CREATININE 0.68 04/24/2018 02:10 AM        Lab Results   Component Value Date/Time    WBC 8.8 04/24/2018 02:10 AM    HEMOGLOBIN 17.1 (H) 04/24/2018 02:10 AM    HEMATOCRIT 50.9 (H) 04/24/2018 02:10 AM    PLATELETCT 210 04/24/2018 02:10 AM        Total time of the discharge process exceeds 45 minutes.

## 2018-04-24 NOTE — CARE PLAN
Problem: Infection  Goal: Will remain free from infection  Outcome: PROGRESSING AS EXPECTED  Assess for s/s of infection. Promote hand hygiene. Utilizing standard precautions.     Problem: Venous Thromboembolism (VTW)/Deep Vein Thrombosis (DVT) Prevention:  Goal: Patient will participate in Venous Thrombosis (VTE)/Deep Vein Thrombosis (DVT)Prevention Measures   04/23/18 2000   OTHER   Risk Assessment Score 2   VTE RISK Moderate   Pharmacologic Prophylaxis Used LMWH: Enoxaparin(Lovenox)

## 2018-04-24 NOTE — DISCHARGE PLANNING
Dr. Pulido consutl recommending IRF level of care . I have verified with daughter Monika that she along with her two sisters are able to support their mothers return to community with 24/7 supervision/assistance.

## 2018-04-24 NOTE — PROGRESS NOTES
Report received from night rn. No issues overnight. Patient currently getting liver US at bedside. Family at bedside. Bed in low locked position. Call bell within reach. Continue to monitor.

## 2018-04-24 NOTE — DISCHARGE PLANNING
Thank you for the opportunity to assist with this patient as they transition to post acute services.  We are aware of the Rehab referral from Dr. Starks.  Dr. Pulido to consult this referral. Our Transitional Case Coordinator will follow. At this time patient is showing to have [employer] F[employer] as their coverage.   Please do not hesitate to call us if you require additional assistance my phone number is [phone] [place].

## 2018-04-25 ENCOUNTER — PATIENT OUTREACH (OUTPATIENT)
Dept: HEALTH INFORMATION MANAGEMENT | Facility: OTHER | Age: 58
End: 2018-04-25

## 2018-04-25 VITALS
WEIGHT: 154.1 LBS | TEMPERATURE: 97.7 F | DIASTOLIC BLOOD PRESSURE: 102 MMHG | OXYGEN SATURATION: 95 % | HEIGHT: 61 IN | BODY MASS INDEX: 29.09 KG/M2 | HEART RATE: 101 BPM | RESPIRATION RATE: 18 BRPM | SYSTOLIC BLOOD PRESSURE: 183 MMHG

## 2018-04-25 LAB
ALBUMIN SERPL BCP-MCNC: 4.2 G/DL (ref 3.2–4.9)
ALBUMIN/GLOB SERPL: 1.4 G/DL
ALP SERPL-CCNC: 165 U/L (ref 30–99)
ALT SERPL-CCNC: 190 U/L (ref 2–50)
ANION GAP SERPL CALC-SCNC: 11 MMOL/L (ref 0–11.9)
AST SERPL-CCNC: 142 U/L (ref 12–45)
BILIRUB SERPL-MCNC: 0.2 MG/DL (ref 0.1–1.5)
BUN SERPL-MCNC: 16 MG/DL (ref 8–22)
CALCIUM SERPL-MCNC: 9.6 MG/DL (ref 8.5–10.5)
CHLORIDE SERPL-SCNC: 105 MMOL/L (ref 96–112)
CO2 SERPL-SCNC: 20 MMOL/L (ref 20–33)
CREAT SERPL-MCNC: 0.73 MG/DL (ref 0.5–1.4)
GLOBULIN SER CALC-MCNC: 2.9 G/DL (ref 1.9–3.5)
GLUCOSE SERPL-MCNC: 102 MG/DL (ref 65–99)
POTASSIUM SERPL-SCNC: 3.8 MMOL/L (ref 3.6–5.5)
PROT SERPL-MCNC: 7.1 G/DL (ref 6–8.2)
SODIUM SERPL-SCNC: 136 MMOL/L (ref 135–145)

## 2018-04-25 PROCEDURE — 80053 COMPREHEN METABOLIC PANEL: CPT

## 2018-04-25 PROCEDURE — 36415 COLL VENOUS BLD VENIPUNCTURE: CPT

## 2018-04-25 PROCEDURE — 99239 HOSP IP/OBS DSCHRG MGMT >30: CPT | Performed by: INTERNAL MEDICINE

## 2018-04-25 PROCEDURE — A9270 NON-COVERED ITEM OR SERVICE: HCPCS | Performed by: HOSPITALIST

## 2018-04-25 PROCEDURE — 700102 HCHG RX REV CODE 250 W/ 637 OVERRIDE(OP): Performed by: HOSPITALIST

## 2018-04-25 PROCEDURE — 700111 HCHG RX REV CODE 636 W/ 250 OVERRIDE (IP): Performed by: HOSPITALIST

## 2018-04-25 RX ORDER — AMLODIPINE BESYLATE 10 MG/1
10 TABLET ORAL DAILY
Status: DISCONTINUED | OUTPATIENT
Start: 2018-04-26 | End: 2018-04-25 | Stop reason: HOSPADM

## 2018-04-25 RX ADMIN — AMLODIPINE BESYLATE 5 MG: 5 TABLET ORAL at 05:27

## 2018-04-25 RX ADMIN — ENOXAPARIN SODIUM 40 MG: 100 INJECTION SUBCUTANEOUS at 05:27

## 2018-04-25 RX ADMIN — STANDARDIZED SENNA CONCENTRATE AND DOCUSATE SODIUM 2 TABLET: 8.6; 5 TABLET, FILM COATED ORAL at 05:27

## 2018-04-25 RX ADMIN — ASPIRIN 325 MG: 325 TABLET ORAL at 05:27

## 2018-04-25 ASSESSMENT — PATIENT HEALTH QUESTIONNAIRE - PHQ9
2. FEELING DOWN, DEPRESSED, IRRITABLE, OR HOPELESS: NOT AT ALL
1. LITTLE INTEREST OR PLEASURE IN DOING THINGS: NOT AT ALL
SUM OF ALL RESPONSES TO PHQ9 QUESTIONS 1 AND 2: 0

## 2018-04-25 ASSESSMENT — PAIN SCALES - GENERAL
PAINLEVEL_OUTOF10: 0
PAINLEVEL_OUTOF10: 0

## 2018-04-25 NOTE — CARE PLAN
Problem: Skin Integrity  Goal: Risk for impaired skin integrity will decrease  Outcome: PROGRESSING AS EXPECTED  Skin assessed. Reminded pt to shift weight frequently.Heels elevated. Ensure skin is clean and dry.     Problem: Pain Management  Goal: Pain level will decrease to patient's comfort goal  Outcome: PROGRESSING AS EXPECTED  Pt denies pain

## 2018-04-25 NOTE — DISCHARGE INSTRUCTIONS
Stroke Prevention  Some health problems and behaviors may make it more likely for you to have a stroke. Below are ways to lessen your risk of having a stroke.  Be active for at least 30 minutes on most or all days.  Do not smoke. Try not to be around others who smoke.  Do not drink too much alcohol.  Do not have more than 2 drinks a day if you are a man.  Do not have more than 1 drink a day if you are a woman and are not pregnant.  Eat healthy foods, such as fruits and vegetables. If you were put on a specific diet, follow the diet as told.  Keep your cholesterol levels under control through diet and medicines. Look for foods that are low in saturated fat, trans fat, cholesterol, and are high in fiber.  If you have diabetes, follow all diet plans and take your medicine as told.  Ask your doctor if you need treatment to lower your blood pressure. If you have high blood pressure (hypertension), follow all diet plans and take your medicine as told by your doctor.  If you are 18-39 years old, have your blood pressure checked every 3-5 years. If you are age 40 or older, have your blood pressure checked every year.  Keep a healthy weight. Eat foods that are low in calories, salt, saturated fat, trans fat, and cholesterol.  Do not take drugs.  Avoid birth control pills, if this applies. Talk to your doctor about the risks of taking birth control pills.  Talk to your doctor if you have sleep problems (sleep apnea).  Take all medicine as told by your doctor.  You may be told to take aspirin or blood thinner medicine. Take this medicine as told by your doctor.  Understand your medicine instructions.  Make sure any other conditions you have are being taken care of.  Get help right away if:  You suddenly lose feeling (you feel numb) or have weakness in your face, arm, or leg.  Your face or eyelid hangs down to one side.  You suddenly feel confused.  You have trouble talking (aphasia) or understanding what people are  saying.  You suddenly have trouble seeing in one or both eyes.  You suddenly have trouble walking.  You are dizzy.  You lose your balance or your movements are clumsy (uncoordinated).  You suddenly have a very bad headache and you do not know the cause.  You have new chest pain.  Your heart feels like it is fluttering or skipping a beat (irregular heartbeat).  Do not wait to see if the symptoms above go away. Get help right away. Call your local emergency services (911 in [place] Do not drive yourself to the hospital.   This information is not intended to replace advice given to you by your health care provider. Make sure you discuss any questions you have with your health care provider.  Document Released: 06/18/2013 Document Revised: 05/25/2017 Document Reviewed: 06/20/2014  [employer] Interactive Patient Education © 2017 [employer]  Discharge Instructions    Discharged to home by car with relative. Discharged via wheelchair, hospital escort: Yes.  Special equipment needed: Not Applicable    Be sure to schedule a follow-up appointment with your primary care doctor or any specialists as instructed.     Discharge Plan:   Smoking Cessation Offered: Patient Counseled  Influenza Vaccine Indication: Patient Refuses    I understand that a diet low in cholesterol, fat, and sodium is recommended for good health. Unless I have been given specific instructions below for another diet, I accept this instruction as my diet prescription.   Other diet: mechanical soft    Special Instructions:     · Is patient discharged on Warfarin / Coumadin?   No   Prevención del ictus  (Stroke Prevention)  Algunos problemas de barrie y ciertas conductas favorecen el ictus. A continuación se indican algunas formas de disminuir el riesgo de tener un ictus.  · Annabelle alguna actividad física al menos lito 30 minutos todos los días.  · No fume. Trate de no rodearse de personas que fuman.  · No lan alcohol en exceso.  ¨ No lan más de dos copas al  día si es hombre.  ¨ No lan más de lesli copa al día si es agnes y no está embarazada.  · Consuma alimentos saludables, bradley frutas y verduras. Si le indican lesli dieta específica, sígala estrictamente.  · Mantenga arnie niveles de colesterol bajo control con la dieta y medicamentos. Consuma alimentos bajos en grasas saturadas, grasas trans, colesterol y ricos en fibra.  · Si tiene diabetes, siga todos los planes dietéticos y tome los medicamentos bradley se le indique.  · Pregúntele al médico si necesita tratamiento para disminuir la presión arterial. Si tiene presión arterial tuyet (hipertensión), siga lesli dieta y tome los medicamentos bradley se lo haya indicado el médico.  · Si usted tiene entre 18 y 39 años, debe medirse la presión arterial cada 3 a 5 años. Si usted tiene 40 años o más, debe medirse la presión arterial todos los años.  · Mantenga un peso saludable. Consuma alimentos bajos en calorías, sal, grasas saturadas, grasas trans y colesterol.  · No consuma drogas.  · Evite las píldoras anticonceptivas, si corresponde. Hable con medina médico acerca de los riesgos de korina píldoras anticonceptivas.  · Hable con medina médico si usted tiene problemas de sueño (apnea del sueño).  · [place] todos los medicamentos según las indicaciones de medina médico.  ¨ Es posible que le indiquen que tome aspirina o anticoagulantes. [place] los medicamentos bradley le indicó el médico.  ¨ Conozca todas las instrucciones de los medicamentos.  · Asegúrese de tener bajo control cualquier otra enfermedad que tenga.  SOLICITE AYUDA DE INMEDIATO SI:  · Pierde repentinamente la sensibilidad (siente adormecimiento) o siente debilidad en el natalie, un brazo o lesli pierna.  · [place] o párpado se caen hacia un lado.  · Se siente súbitamente confundido.  · Tiene dificultad para hablar afasia o comprender lo que las personas dicen.  · Presenta dificultad para la visión de joseph o ambos ojos.  · Tiene dificultad repentina para caminar.  · Tiene mareos.  · Pierde el  equilibrio o arnie movimientos son torpes (falta de coordinación).  · Siente un dolor de yamile súbito e intenso y no sabe la causa.  · Siente un dolor nuevo en el pecho.  · Siente un aleteo en el corazón o le falta un latido (ritmo cardíaco irregular).  No espere para moar si los síntomas desaparecen. Solicite ayuda de inmediato. Comuníquese con el servicio de emergencias de medina localidad (911 en los [place]). No conduzca por arnie propios medios hasta el hospital.   Esta información no tiene bradley fin reemplazar el consejo del médico. Asegúrese de hacerle al médico cualquier pregunta que tenga.  Document Released: 06/18/2013 Document Revised: 01/08/2016 Document Reviewed: 06/20/2014  [employer] Interactive Patient Education © 2017 [employer]      Depression / Suicide Risk    As you are discharged from this [facility] facility, it is important to learn how to keep safe from harming yourself.    Recognize the warning signs:  · Abrupt changes in personality, positive or negative- including increase in energy   · Giving away possessions  · Change in eating patterns- significant weight changes-  positive or negative  · Change in sleeping patterns- unable to sleep or sleeping all the time   · Unwillingness or inability to communicate  · Depression  · Unusual sadness, discouragement and loneliness  · Talk of wanting to die  · Neglect of personal appearance   · Rebelliousness- reckless behavior  · Withdrawal from people/activities they love  · Confusion- inability to concentrate     If you or a loved one observes any of these behaviors or has concerns about self-harm, here's what you can do:  · Talk about it- your feelings and reasons for harming yourself  · Remove any means that you might use to hurt yourself (examples: pills, rope, extension cords, firearm)  · Get professional help from the community (Mental Health, Substance Abuse, psychological counseling)  · Do not be alone:Call your Safe Contact- someone whom  you trust who will be there for you.  · Call your local CRISIS HOTLINE 666-1669 or 772-906-9761  · Call your local Children's Mobile Crisis Response Team [place] (654) 836-9472 or www.[employer]  · Call the toll free National Suicide Prevention Hotlines   · National Suicide Prevention Lifeline 026-114-UZTB (5852)  · National [employer]ju[employer] Line Network 800-SUICIDE (197-2053)

## 2018-04-25 NOTE — PROGRESS NOTES
Patient and family given discharge information. Patient verbalized understanding. Iv and tele discontinued. Family will be taking her down in wheelchair accompanied by RN.

## 2018-04-25 NOTE — PROGRESS NOTES
Report received from night rn. No issues overnight. Patient to be discharged this am. Will follow up. Bed in low locked position, call bell within reach. Continue to monitor.

## 2018-04-26 LAB
HCV RNA SERPL NAA+PROBE-ACNC: <15 IU/ML
HCV RNA SERPL NAA+PROBE-ACNC: <15 IU/ML
HCV RNA SERPL NAA+PROBE-LOG IU: <1.2 LOG IU
HCV RNA SERPL NAA+PROBE-LOG IU: <1.2 LOG IU
HCV RNA SERPL QL NAA+PROBE: NOT DETECTED
HCV RNA SERPL QL NAA+PROBE: NOT DETECTED
PATHOLOGY STUDY: NORMAL
PATHOLOGY STUDY: NORMAL

## 2018-04-30 NOTE — DISCHARGE PLANNING
April 31, 2018 1330    Call from Anu with [facility] (Jeremiah WATKINS) who was asking for clarification on discharge instructions for PT/OT. EPIC reviewed and discussed and Anu states [facility] does have an out-pt PT/OT office.

## 2018-05-10 LAB — EKG IMPRESSION: NORMAL

## 2018-07-19 ENCOUNTER — TELEPHONE (OUTPATIENT)
Dept: NEUROLOGY | Facility: MEDICAL CENTER | Age: 58
End: 2018-07-19

## 2018-07-19 NOTE — TELEPHONE ENCOUNTER
Pt's daughter called and let me know that pt went to ER 7/18/18 at night because of left side head pain. Pt described pain a little bit under left ear, pressure starts to build up and a sharp pain occurs and spreads to top left side of head. Pt has an appt on 8/6/18 as a new patient for her CVA and ER told daughter to call our office to let us know. Symptoms started on 7/17/18 and escalated on 7/18 and is still ongoing till this date (7/19). I advised pt to go to urgent care or ER again if pain worsens.

## 2018-07-30 ENCOUNTER — HOSPITAL ENCOUNTER (OUTPATIENT)
Dept: RADIOLOGY | Facility: MEDICAL CENTER | Age: 58
End: 2018-07-30
Attending: NURSE PRACTITIONER
Payer: MEDICAID

## 2018-07-30 DIAGNOSIS — R51.9 NONINTRACTABLE HEADACHE, UNSPECIFIED CHRONICITY PATTERN, UNSPECIFIED HEADACHE TYPE: ICD-10-CM

## 2018-07-30 DIAGNOSIS — I63.9 CEREBROVASCULAR ACCIDENT (CVA), UNSPECIFIED MECHANISM (HCC): ICD-10-CM

## 2018-07-30 DIAGNOSIS — R42 DIZZY: ICD-10-CM

## 2018-07-30 PROCEDURE — 70551 MRI BRAIN STEM W/O DYE: CPT

## 2018-10-04 ENCOUNTER — OFFICE VISIT (OUTPATIENT)
Dept: NEUROLOGY | Facility: MEDICAL CENTER | Age: 58
End: 2018-10-04
Payer: MEDICAID

## 2018-10-04 VITALS
OXYGEN SATURATION: 99 % | BODY MASS INDEX: 31.28 KG/M2 | WEIGHT: 165.68 LBS | DIASTOLIC BLOOD PRESSURE: 64 MMHG | HEART RATE: 77 BPM | HEIGHT: 61 IN | TEMPERATURE: 98.2 F | SYSTOLIC BLOOD PRESSURE: 122 MMHG

## 2018-10-04 DIAGNOSIS — F19.11 HISTORY OF DRUG ABUSE (HCC): ICD-10-CM

## 2018-10-04 DIAGNOSIS — I63.89 CEREBROVASCULAR ACCIDENT (CVA) DUE TO OTHER MECHANISM (HCC): ICD-10-CM

## 2018-10-04 PROCEDURE — 99214 OFFICE O/P EST MOD 30 MIN: CPT | Performed by: PHYSICIAN ASSISTANT

## 2018-10-04 RX ORDER — ATORVASTATIN CALCIUM 10 MG/1
10 TABLET, FILM COATED ORAL NIGHTLY
COMMUNITY

## 2018-10-04 RX ORDER — LISINOPRIL 10 MG/1
10 TABLET ORAL DAILY
COMMUNITY

## 2018-10-04 NOTE — PROGRESS NOTES
"Subjective:      Lindsay Polanco is a 58 y.o. female who presents with New Patient (CVA)    Patient had a stroke and was discharged from the hospital in April of 2018.   She has a hx of meth use.  She had elevated LFT in hospital after statin was started and was found to be positive for Hep C.  She was started on an aspirin although her brain imaging does show possible amyloid angiopathy vs other cause of hypertensive episodes ie meth use.    I tried to speak with her today about her meth use and she is resistant.  Denies and says stroke was caused by high blood pressure instead.  Present today with her daughter who doesn't say much except at the end of the visit asked for pain medications for her mother.      Symptoms associated with this episode:  right sided weakness    Symptoms have resolved except right side is still weak and speech is not yet normal    Patient was seen in the hospital by neurology.  Dr. Sarabia    Stroke Prevention Medications taking currently: aspirin    (PCP) Primary Doctor:  saurav montero    [facility] reviewed    Medications and Allergies reviewed    Social: lives in [place] with her mom's boyfriend.  Her mom is in a nursing home at this time.   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    Test Results Reviewed:    MRI:  Left thalamic stroke    CTA Head::  No intracranial aneurysm, focal stenosis or abrupt large vessel cut off.    CTA Neck:  No focal stenosis, dissection or occlusion of the cervical carotid or vertebral arteries.    Echo:  Compared to the images of the study done 9/27/2013 - there has been no   significant change.  Normal left ventricular size and systolic function.  No evidence of right to left shunt by agitated saline challenge.      UDS: positive for amphetamines on this visit        DX:  Stroke       Personal Risk factors associated with recurrent stroke:    Some risk factors for stroke are \"non-modifiable\" meaning we cannot change them.  Examples of these types of " risk factors are:    *   Age - once we turn 55, stroke becomes more common.  It is associated with aging and in fact every decade over 55 our stroke risk doubles.  * Gender - Men have more strokes than women, although this gender difference is only slight  * Ethnicity and/or family history:  if your parents, grandparents, siblings or children have had stroke or if you have ancestors of , , or  descent, you may have inherited some genes pre-disposing your toward stroke    To reduce your risk of recurrent stroke, we want to focus on risk factors we can modify.  Any of the checked items below are your personal risk factors and you should work with your PCP (primary care provider) to get them to the goal (goals are in bold).   These risk factors are:     ___Diabetes - goal HA1c is <7.0%: Current:     5.7    _X__Hypertension - goal is <140 or  top number at all times.   Current: 122/64  Recommend taking bp once weekly at minimum, and taking log to PCP at every visit.    Continue current Blood pressure medication - avoid salt    __X_Hyperlipidemia - goal LDL is <70.  Current: 104.  Will not be able to take statin due to being hep C + and having elevated LFT.  She is defensive about this and says she got it from her dad because he was in the hospital. You can lower your cholesterol by going on a low cholesterol diet.  You should discuss this with your primary care provider if you wish to also reduce your cholesterol by changing your diet.  In general to reduce cholesterol - eat less cheese, and reduce intake of pork, shrimp, and beef.      _X__Smoking:   Once you are 5 years out from when you quit smoking, the history of smoking does not appear to be a significant risk factor for recurrent stroke.  If you are quitting, implement stress reduction with exercise such as daily walking, yoga, or meditation.  If you are unable to quit on your own, ask your PCP about chantix or referral for  "smoking cessation    _X__Overweight:  Current BMI - 31.  Recommend 16-20 pound weight loss    Body Mass Index (BMI) is your height compared to your weight.  Goal BMI to reduce risk of recurrent stroke is <25   BMI 25-27 diet/exercise to get to goal BMI.    BMI >27 patient should begin with goal of 10% weight loss  Work on this risk factor aggressively with your PCP             _X__Physical Inactivity: Counseled on initiating 30 minutes of moderate exercise most days, but at least three times per week. Moderate exercise can be walking, swimming, cycling.  Work up to goal by setting realistic goal and then increasing it weekly or monthly.    ___Atrial Fibrillation: no History of afib     ___Sleep Apnea: not diagnosed with sleep apnea.    If you snore and have episodes where you stop breathing, speak to your PCP about whether a pulse oximetry test or referral for a sleep study are needed.   Untreated sleep apnea is associated with recurrent stroke    _X__Alcohol or Drug use:   Counseled to avoid both as meth use caused this stroke;                    HPI    ROS       Objective:     /64 (BP Location: Right arm, Patient Position: Sitting, BP Cuff Size: Small adult)   Pulse 77   Temp 36.8 °C (98.2 °F) (Temporal)   Ht 1.549 m (5' 1\")   Wt 75.1 kg (165 lb 10.8 oz)   SpO2 99%   BMI 31.30 kg/m²      Physical Exam  Well developed, well nourished - vital signs reviewed  Alert and Oriented x 3, Affect Appropriate, Fund of knowledge within normal limits, Memory intact  Cranial Nerves: , EOMI without nystagmus or opthalmoplegia,   face symmetric in strength and sensation, no facial droop, tongue midline without atrophy or fasiculations, shoulder shrug is normal bilaterally, speech clear and fluent no aphasia  Motor:  Slight right leg weaknessSensation: Light touch equal and intact in all extremities, No sensory deficits  Gait: normal gait no assistive devices required         Assessment/Plan:     Ischemic stroke - " "mechanism due to methamphetamine use.  I encouraged patient to participate in treatment regimen to stay off meth and she says \"I have it handled.\"  She tells me she doesn't make enough money to pay for her blood pressure medications and that is why she started using again.  She is also attempting to her her mother who lives in a SNF in [place] back into her home - likely to help with expenses.  I doubt this is a good idea.    Work with PCP on risk factors checked above to reduce risk of recurrent stroke.    Medication you are taking to thin your blood and reduce your risk of recurrent stroke is aspirin.    Counseled on when to call 911 and if desired additional information on stroke was provided    Return to our office:  not required - this is a one time visit to review your risk factors for stroke so you can discuss them with your primary care provider.    Total time with this visit:  30   Minutes face-to-face with patient. More than 50% of this visit was spent educating patient on their illness and/or coordinating care, as detailed above      "

## 2018-10-04 NOTE — PATIENT INSTRUCTIONS
"DX:  Stroke       Personal Risk factors associated with recurrent stroke:    Some risk factors for stroke are \"non-modifiable\" meaning we cannot change them.  Examples of these types of risk factors are:    *   Age - once we turn 55, stroke becomes more common.  It is associated with aging and in fact every decade over 55 our stroke risk doubles.  * Gender - Men have more strokes than women, although this gender difference is only slight  * Ethnicity and/or family history:  if your parents, grandparents, siblings or children have had stroke or if you have ancestors of , , or  descent, you may have inherited some genes pre-disposing your toward stroke    To reduce your risk of recurrent stroke, we want to focus on risk factors we can modify.  Any of the checked items below are your personal risk factors and you should work with your PCP (primary care provider) to get them to the goal (goals are in bold).   These risk factors are:     ___Diabetes - goal HA1c is <7.0%: Current:     5.7    _X__Hypertension - goal is <140 or  top number at all times.   Current: 122/64  Recommend taking bp once weekly at minimum, and taking log to PCP at every visit.    Continue current Blood pressure medication - avoid salt    __X_Hyperlipidemia - goal LDL is <70.  Current: 104.  Will not be able to take statin due to being hep C + and having elevated LFT.  She is defensive about this and says she got it from her dad because he was in the hospital. You can lower your cholesterol by going on a low cholesterol diet.  You should discuss this with your primary care provider if you wish to also reduce your cholesterol by changing your diet.  In general to reduce cholesterol - eat less cheese, and reduce intake of pork, shrimp, and beef.      _X__Smoking:   Once you are 5 years out from when you quit smoking, the history of smoking does not appear to be a significant risk factor for recurrent stroke.  If " you are quitting, implement stress reduction with exercise such as daily walking, yoga, or meditation.  If you are unable to quit on your own, ask your PCP about chantix or referral for smoking cessation    _X__Overweight:  Current BMI - 31.  Recommend 16-20 pound weight loss    Body Mass Index (BMI) is your height compared to your weight.  Goal BMI to reduce risk of recurrent stroke is <25   BMI 25-27 diet/exercise to get to goal BMI.    BMI >27 patient should begin with goal of 10% weight loss  Work on this risk factor aggressively with your PCP             _X__Physical Inactivity: Counseled on initiating 30 minutes of moderate exercise most days, but at least three times per week. Moderate exercise can be walking, swimming, cycling.  Work up to goal by setting realistic goal and then increasing it weekly or monthly.    ___Atrial Fibrillation: no History of afib     ___Sleep Apnea: not diagnosed with sleep apnea.    If you snore and have episodes where you stop breathing, speak to your PCP about whether a pulse oximetry test or referral for a sleep study are needed.   Untreated sleep apnea is associated with recurrent stroke    _X__Alcohol or Drug use:   Counseled to avoid both as meth use caused this stroke;      Plan:    Ischemic stroke - mechanism likely due to methamphetamine use.    Work with PCP on risk factors checked above to reduce risk of recurrent stroke.    Medication you are taking to thin your blood and reduce your risk of recurrent stroke is aspirin.    Counseled on when to call 911 and if desired additional information on stroke was provided    Return to our office:  not required - this is a one time visit to review your risk factors for stroke so you can discuss them with your primary care provider.

## 2024-04-18 ENCOUNTER — APPOINTMENT (OUTPATIENT)
Dept: NEUROLOGY | Facility: MEDICAL CENTER | Age: 64
End: 2024-04-18
Attending: STUDENT IN AN ORGANIZED HEALTH CARE EDUCATION/TRAINING PROGRAM
Payer: MEDICAID

## 2024-07-25 ENCOUNTER — APPOINTMENT (OUTPATIENT)
Dept: NEUROLOGY | Facility: MEDICAL CENTER | Age: 64
End: 2024-07-25
Attending: SPECIALIST
Payer: MEDICAID